# Patient Record
Sex: MALE | Race: WHITE
[De-identification: names, ages, dates, MRNs, and addresses within clinical notes are randomized per-mention and may not be internally consistent; named-entity substitution may affect disease eponyms.]

---

## 2020-03-09 ENCOUNTER — HOSPITAL ENCOUNTER (INPATIENT)
Dept: HOSPITAL 95 - ER | Age: 60
LOS: 7 days | Discharge: HOME | DRG: 280 | End: 2020-03-16
Attending: INTERNAL MEDICINE | Admitting: INTERNAL MEDICINE
Payer: COMMERCIAL

## 2020-03-09 VITALS — HEIGHT: 72.99 IN | WEIGHT: 209.44 LBS | BODY MASS INDEX: 27.76 KG/M2

## 2020-03-09 DIAGNOSIS — J18.9: ICD-10-CM

## 2020-03-09 DIAGNOSIS — Z86.73: ICD-10-CM

## 2020-03-09 DIAGNOSIS — E11.22: ICD-10-CM

## 2020-03-09 DIAGNOSIS — I42.8: ICD-10-CM

## 2020-03-09 DIAGNOSIS — F17.210: ICD-10-CM

## 2020-03-09 DIAGNOSIS — I13.0: ICD-10-CM

## 2020-03-09 DIAGNOSIS — N18.2: ICD-10-CM

## 2020-03-09 DIAGNOSIS — I50.23: ICD-10-CM

## 2020-03-09 DIAGNOSIS — I21.A1: ICD-10-CM

## 2020-03-09 DIAGNOSIS — I48.20: Primary | ICD-10-CM

## 2020-03-09 DIAGNOSIS — G47.33: ICD-10-CM

## 2020-03-09 DIAGNOSIS — N17.9: ICD-10-CM

## 2020-03-09 LAB
ALBUMIN SERPL BCP-MCNC: 3.4 G/DL (ref 3.4–5)
ALBUMIN/GLOB SERPL: 0.8 {RATIO} (ref 0.8–1.8)
ALT SERPL W P-5'-P-CCNC: 98 U/L (ref 12–78)
ANION GAP SERPL CALCULATED.4IONS-SCNC: 9 MMOL/L (ref 6–16)
AST SERPL W P-5'-P-CCNC: 52 U/L (ref 12–37)
BASOPHILS # BLD AUTO: 0.03 K/MM3 (ref 0–0.23)
BASOPHILS NFR BLD AUTO: 0 % (ref 0–2)
BILIRUB SERPL-MCNC: 0.6 MG/DL (ref 0.1–1)
BUN SERPL-MCNC: 21 MG/DL (ref 8–24)
CALCIUM SERPL-MCNC: 8.6 MG/DL (ref 8.5–10.1)
CHLORIDE SERPL-SCNC: 107 MMOL/L (ref 98–108)
CO2 SERPL-SCNC: 20 MMOL/L (ref 21–32)
CREAT SERPL-MCNC: 1.27 MG/DL (ref 0.6–1.2)
DEPRECATED RDW RBC AUTO: 43.7 FL (ref 35.1–46.3)
EOSINOPHIL # BLD AUTO: 0.04 K/MM3 (ref 0–0.68)
EOSINOPHIL NFR BLD AUTO: 0 % (ref 0–6)
ERYTHROCYTE [DISTWIDTH] IN BLOOD BY AUTOMATED COUNT: 12.7 % (ref 11.7–14.2)
GLOBULIN SER CALC-MCNC: 4.1 G/DL (ref 2.2–4)
GLUCOSE SERPL-MCNC: 338 MG/DL (ref 70–99)
HCT VFR BLD AUTO: 41.2 % (ref 37–53)
HGB BLD-MCNC: 13.6 G/DL (ref 13.5–17.5)
IMM GRANULOCYTES # BLD AUTO: 0.02 K/MM3 (ref 0–0.1)
IMM GRANULOCYTES NFR BLD AUTO: 0 % (ref 0–1)
LYMPHOCYTES # BLD AUTO: 2.01 K/MM3 (ref 0.84–5.2)
LYMPHOCYTES NFR BLD AUTO: 23 % (ref 21–46)
MAGNESIUM SERPL-MCNC: 1.8 MG/DL (ref 1.6–2.4)
MCHC RBC AUTO-ENTMCNC: 33 G/DL (ref 31.5–36.5)
MCV RBC AUTO: 93 FL (ref 80–100)
MONOCYTES # BLD AUTO: 0.77 K/MM3 (ref 0.16–1.47)
MONOCYTES NFR BLD AUTO: 9 % (ref 4–13)
NEUTROPHILS # BLD AUTO: 6.04 K/MM3 (ref 1.96–9.15)
NEUTROPHILS NFR BLD AUTO: 68 % (ref 41–73)
NRBC # BLD AUTO: 0 K/MM3 (ref 0–0.02)
NRBC BLD AUTO-RTO: 0 /100 WBC (ref 0–0.2)
PLATELET # BLD AUTO: 215 K/MM3 (ref 150–400)
POTASSIUM SERPL-SCNC: 4.2 MMOL/L (ref 3.5–5.5)
PROT SERPL-MCNC: 7.5 G/DL (ref 6.4–8.2)
PROTHROMBIN TIME: 11.4 SEC (ref 9.7–11.5)
SODIUM SERPL-SCNC: 136 MMOL/L (ref 136–145)
TROPONIN I SERPL-MCNC: 0.13 NG/ML (ref 0–0.04)
TSH SERPL DL<=0.005 MIU/L-ACNC: 0.99 UIU/ML (ref 0.36–4.8)

## 2020-03-09 PROCEDURE — C1769 GUIDE WIRE: HCPCS

## 2020-03-09 PROCEDURE — C1887 CATHETER, GUIDING: HCPCS

## 2020-03-09 PROCEDURE — A9270 NON-COVERED ITEM OR SERVICE: HCPCS

## 2020-03-09 PROCEDURE — C1894 INTRO/SHEATH, NON-LASER: HCPCS

## 2020-03-09 NOTE — NUR
SHIFT SUMMARY.
A&OX4, INDEPENDENT IN ROOM, PLEASANT AND COPERATIVE. PT DENIES PAIN, SOB, N/V.
LUNGS CLEAR, ALTHOGH TIGHT, ON RA. PT REPORTS THAT HE FEELS THAT HE WILL NOT
WANT TO SMOKE FURTHER. NO NEW CHANGES OR CONCERNS.

## 2020-03-09 NOTE — NUR
1720 PT ADMITTED TO MEDICAL FLOOR VIA GURNEY, SELF TRANSFERED TO BED WITHOUT
ISSUE. PT INSISTED THAT HE GO OUTSIDE TO SMOKE IMMEDIATELY, PT COUNSELED ON
SMOKING SENSATION AND RISKS ASSOCIATED WITH SMOKING WITH CURRENT CONDITION,
INCLUDING SUDDEN DEATH. PT ON RA, LUNGS CLEAR ALTHOGH TIGHT.
PT REQUESTED W/C. ABLE TO ATTAIN SET OF VS. 1740 PT'S FRIEND WHEELED PT.
1750 PT RETURNED TO ROOM.

## 2020-03-10 LAB
ANION GAP SERPL CALCULATED.4IONS-SCNC: 6 MMOL/L (ref 6–16)
BASOPHILS # BLD AUTO: 0.05 K/MM3 (ref 0–0.23)
BASOPHILS NFR BLD AUTO: 1 % (ref 0–2)
BUN SERPL-MCNC: 24 MG/DL (ref 8–24)
CALCIUM SERPL-MCNC: 8.5 MG/DL (ref 8.5–10.1)
CHLORIDE SERPL-SCNC: 107 MMOL/L (ref 98–108)
CO2 SERPL-SCNC: 26 MMOL/L (ref 21–32)
CREAT SERPL-MCNC: 1.44 MG/DL (ref 0.6–1.2)
DEPRECATED RDW RBC AUTO: 44.5 FL (ref 35.1–46.3)
EOSINOPHIL # BLD AUTO: 0.23 K/MM3 (ref 0–0.68)
EOSINOPHIL NFR BLD AUTO: 3 % (ref 0–6)
ERYTHROCYTE [DISTWIDTH] IN BLOOD BY AUTOMATED COUNT: 13 % (ref 11.7–14.2)
GLUCOSE SERPL-MCNC: 237 MG/DL (ref 70–99)
HCT VFR BLD AUTO: 39.4 % (ref 37–53)
HGB BLD-MCNC: 12.8 G/DL (ref 13.5–17.5)
IMM GRANULOCYTES # BLD AUTO: 0.01 K/MM3 (ref 0–0.1)
IMM GRANULOCYTES NFR BLD AUTO: 0 % (ref 0–1)
LYMPHOCYTES # BLD AUTO: 4.54 K/MM3 (ref 0.84–5.2)
LYMPHOCYTES NFR BLD AUTO: 53 % (ref 21–46)
MCHC RBC AUTO-ENTMCNC: 32.5 G/DL (ref 31.5–36.5)
MCV RBC AUTO: 93 FL (ref 80–100)
MONOCYTES # BLD AUTO: 0.77 K/MM3 (ref 0.16–1.47)
MONOCYTES NFR BLD AUTO: 9 % (ref 4–13)
NEUTROPHILS # BLD AUTO: 3.05 K/MM3 (ref 1.96–9.15)
NEUTROPHILS NFR BLD AUTO: 35 % (ref 41–73)
NRBC # BLD AUTO: 0 K/MM3 (ref 0–0.02)
NRBC BLD AUTO-RTO: 0 /100 WBC (ref 0–0.2)
PLATELET # BLD AUTO: 190 K/MM3 (ref 150–400)
POTASSIUM SERPL-SCNC: 3.9 MMOL/L (ref 3.5–5.5)
SODIUM SERPL-SCNC: 139 MMOL/L (ref 136–145)

## 2020-03-10 NOTE — NUR
SHIFT SUMMARY
AOX4. TELE RUNNING AFIB c  THIS AM. REST VSS. DENIES DYSPNEA, N/V OR
CHEST PAIN. STATES WHEN HE LAYS FLAT HE GETS A "FLUTTER" FEELING IN CHEST,
THEREFORE SLEEPING W/HOB ELEVATED HELPS. RESPIRATORY PANEL NEGATIVE. SWABBED
NARES & THROAT FOR MRSA R/O. LUNGS SOUND TIGHT & DIM T/O. IND IN ROOM. CALL
LIGHT IN REACH.

## 2020-03-10 NOTE — NUR
SHIFT SUMMARY
 
PATIENT DENIES PAIN, NAUSEA, AND SHORTNESS OF BREATH. PATIENT UP INDEPENDENT
IN ROOM. PATIENT HAD CARDIAC CONSULT AND ECHO TODAY. NEW ORDERS FOR HEPARIN
DRIP THIS AFTERNOON. HEPARIN RUNNING AT 13 U/KG. DR. LOUIE SAW PATIENT THIS
EVENING, PATIENT HAVING POSSIBLE ANGIO TOMORROW. PATIENT STRICT I&O'S. CALL
LIGHT IN REACH.

## 2020-03-11 LAB
ANION GAP SERPL CALCULATED.4IONS-SCNC: 6 MMOL/L (ref 6–16)
BUN SERPL-MCNC: 28 MG/DL (ref 8–24)
CALCIUM SERPL-MCNC: 8.9 MG/DL (ref 8.5–10.1)
CHLORIDE SERPL-SCNC: 106 MMOL/L (ref 98–108)
CO2 SERPL-SCNC: 25 MMOL/L (ref 21–32)
CREAT SERPL-MCNC: 1.32 MG/DL (ref 0.6–1.2)
GLUCOSE SERPL-MCNC: 276 MG/DL (ref 70–99)
MAGNESIUM SERPL-MCNC: 1.8 MG/DL (ref 1.6–2.4)
PHOSPHATE SERPL-MCNC: 4.4 MG/DL (ref 2.5–4.9)
POTASSIUM SERPL-SCNC: 3.8 MMOL/L (ref 3.5–5.5)
SODIUM SERPL-SCNC: 137 MMOL/L (ref 136–145)

## 2020-03-11 PROCEDURE — B2111ZZ FLUOROSCOPY OF MULTIPLE CORONARY ARTERIES USING LOW OSMOLAR CONTRAST: ICD-10-PCS | Performed by: INTERNAL MEDICINE

## 2020-03-11 PROCEDURE — 5A2204Z RESTORATION OF CARDIAC RHYTHM, SINGLE: ICD-10-PCS | Performed by: INTERNAL MEDICINE

## 2020-03-11 PROCEDURE — 4A023N7 MEASUREMENT OF CARDIAC SAMPLING AND PRESSURE, LEFT HEART, PERCUTANEOUS APPROACH: ICD-10-PCS | Performed by: INTERNAL MEDICINE

## 2020-03-11 NOTE — NUR
PATIENT TRANSFER
 
PATIENT TRANSFERD TO [facility] [place] FOR ANGIO. PATIENT TO GO TO PCU AFTER
PROCEDURE. REPORT GIVEN TO RECEIVING PCU NURSE.

## 2020-03-11 NOTE — NUR
PATIENT ARRIVED FROM [facility] THIS AFTERNOON. TR BAND STARTED AT 15 CC,
REMOVED AT 1815. R. WRIST PUNCTURE SITE NON-TENDER, NO DISCHARGE NOTED.
PATIENT EXPRESSED FEELING ANXIOUS THIS EVENING, TOOK A WALK IN THE HALLWAY AND
ENDORSED RELIEF OF ANXIETY. PATIENT'S HEPARIN DRIP WAS STOPPED AT 1800 WITH
ADMINISTRATION OF XARELTO PER ORDERS. AMIODORONE DRIP RATE ADJUSTED AT 1845
AND TITRATED DOWN PER ORDERS.

## 2020-03-11 NOTE — NUR
SHIFT SUMMARY
NO ACUTE CHANGES THIS SHIFT. PT REQUESTED SLEEPING PILL THIS EVENING AND SLEPT
THROUGH MUCH OF THE NIGHT FOLLOWING. VITAL SIGNS STABLE. TELEMETRY READING
AFIB 110'S TONIGHT. PT DENIES ANY CHEST PAIN OR SOB. HEPARIN DRIP INFUSING
THROUGHOUT THE NIGHT, RATE INCREASED TO 15 UNITS/KG/HR. PT NPO EXCEPT ICE
CHIPS SINCE 0000 FOR PROCEDURE TODAY. PT RESTING IN BED AT THIS TIME. WILL
CONTINUE TO MONITOR.

## 2020-03-12 LAB
ANION GAP SERPL CALCULATED.4IONS-SCNC: 11 MMOL/L (ref 6–16)
BUN SERPL-MCNC: 31 MG/DL (ref 8–24)
CALCIUM SERPL-MCNC: 9 MG/DL (ref 8.5–10.1)
CHLORIDE SERPL-SCNC: 104 MMOL/L (ref 98–108)
CO2 SERPL-SCNC: 21 MMOL/L (ref 21–32)
CREAT SERPL-MCNC: 1.48 MG/DL (ref 0.6–1.2)
GLUCOSE SERPL-MCNC: 175 MG/DL (ref 70–99)
POTASSIUM SERPL-SCNC: 3.7 MMOL/L (ref 3.5–5.5)
SODIUM SERPL-SCNC: 136 MMOL/L (ref 136–145)

## 2020-03-12 NOTE — NUR
UPDATE
DR BROWNLEE NOTIFIED OF PATIENT'S LOW BLOOD PRESSURE. DR BROWNLEE STATED THAT IT WAS
FINE TO GO AHEAD AND GIVE PATIENT'S 2100 DOSE OF AMIODARONE.

## 2020-03-12 NOTE — NUR
SHIFT SUMMARY
PATIENT ANXIOUS BUT COOPERATIVE THROUGHOUT THE NIGHT. PATIENT VERY FIDGTY AND
UNCOMFORTABLE BUT PATIENT COULD NOT STATE EXACTLY WHY HE WAS UNCOMFORTABLE.
PATIENT CONTINUES TO BE SHORT OF BREATH WHEN LAYING DOWN. PATIENT EVEN STATED
THAT HE FELT NAUSOUS WHEN LAYING DOWN AT ONE TIME, MEDICATED PER EMAR. PATIENT
INDPENDENT IN THE ROOM AND WENT ON A WALK LAST NIGHT TO TRY TO HELP HIM SLEEP.
PATIENT APPEARED TO NAP ON AND OFF THROUGHOUT THE NIGHT. PATIENT USED 2L O2
PRN FOR COMFORT. PATIENT CURRENTLY RESTING IN BED. AMIODERONE GTT RUNNING PER
ORDERS. WILL CONTINUE TO MONITOR PATIENT AND GIVE BEDSIDE REPORT TO ONCOMING
RN.

## 2020-03-12 NOTE — NUR
NO ACUTE EVENTS THIS SHIFT. PATIENT STARTED ON PO AMIODORONE THIS MORNING. HAS
REMAINED IN A FIB THIS SHIFT IN THE 90S-110S. PATIENT ENDORSED FEELING
ANXIOUS, JITTERY, AND LIGHT SENSITIVE. DR. BASS WAS NOTIFIED, GAVE ORDERS FOR
PO ATIVAN. PATIENT ENDORSED THAT THE ATIVAN HELPED.

## 2020-03-13 LAB
ANION GAP SERPL CALCULATED.4IONS-SCNC: 9 MMOL/L (ref 6–16)
BUN SERPL-MCNC: 37 MG/DL (ref 8–24)
CALCIUM SERPL-MCNC: 8.8 MG/DL (ref 8.5–10.1)
CHLORIDE SERPL-SCNC: 106 MMOL/L (ref 98–108)
CO2 SERPL-SCNC: 21 MMOL/L (ref 21–32)
CREAT SERPL-MCNC: 2.3 MG/DL (ref 0.6–1.2)
GLUCOSE SERPL-MCNC: 207 MG/DL (ref 70–99)
MAGNESIUM SERPL-MCNC: 2.1 MG/DL (ref 1.6–2.4)
POTASSIUM SERPL-SCNC: 4.1 MMOL/L (ref 3.5–5.5)
SODIUM SERPL-SCNC: 136 MMOL/L (ref 136–145)

## 2020-03-13 NOTE — NUR
INITIAL ASSESSMENT
  PATIENT SITTING ON SIDE OF BED UPON ENTERING ROOM.  PATIENT ALERT AND
ORIENTED X 4, AFEBRILE.  PATIENT ANXIOUS AND IRRITABLE.  PATIENT GIVEN PRN
ATIVAN UPON REQUEST.  PATIENT INDEPENDENT IN ROOM.  PATIENT GIVEN PRN TYLENOL
FOR COMPLAINT OF HEADACHE.  PATIENT SATTING 90% AND GREATER ON RA.  LUNGS
CLEAR THROUGHOUT.  PATIENT STATES HE HAS OCCASIONAL DRY COUGH.  PATIENT IN
A.FIB, .  BP STABLE.  PULSES STRONG.  GI WNL PER PATIENT.   WNL.  R
RADIAL CATH LAB ACCESS SITE NOTED.  ARMBOARD AND TEGADERM IN PLACE.  NO
BLEEDING, BRUISING, OR HEMATOMA NOTED.  IV FLUSHED AND SALINE LOCKED.  BED
LOW, CALL LIGHT IN REACH.  WILL CONTINUE TO MONITOR PATIENT FREQUENTLY
THROUGHOUT SHIFT.

## 2020-03-13 NOTE — NUR
SHIFT SUMMARY
PATIENT ANXIOUS AT TIMES THROUGHOUT THE NIGHT, PATIENT MEDICATED FOR ANXIETY
PER EMAR AND AS ABLE TO DUE TO LOW BLOOD PRESSURE. PATIENT AWAKE AND UP
WALKING FREQUENTLY AT THE BEGINNING OF THE NIGHT BUT THEN WAS ABLE TO SETTLE
DOWN AND APPEARED TO SLEEP WELL THROUGHOUT THE REST OF THE NIGHT. PATIENT
REQUESTING NOT TO HAVE BEDSIDE REPORT THIS MORNING. WILL CONTINUE TO MONITOR
PATIENT AND REPORT TO ONCOMING RN.

## 2020-03-13 NOTE — NUR
SHIFT SUMMARY:
 
PT SELPT MOST OF THIS SHIFT. WENT FOR A WALK X 1, SHOWERED. USING BR
INDEPENDENTLY. ADEQUATE PO INTAKE. C/O HEADACHE, MEDICATED WITH TYLENOL WITH
GOOD EFFECT. DENIED CHEST DISCOMFORT, SOB, KOCH.

## 2020-03-13 NOTE — NUR
PATIENT CAME OUT OF ROOM TO NURSES DESK, SET DOWN TELE MONITOR THAT HE TOOK
OFF OF HIMSELF, AND STATED TO NURSE TO "TAKE IV OUT BECAUSE HE IS LEAVING".
NURSE SPOKE WITH PATIENT AND PATIENT CONTINUES TO BE IRRITABLE AND ANXIOUS.
PATIENT STATES HE IS UPSET BECAUSE NURSE GAVE HIM PRN ATIVAN 10 MINUTES AFTER
ASKING AND THAT IT DID NOT WORK BECAUSE IT "WAS GIVEN TOO LATE".  INFORMED
PATIENT THAT HE CAN HAVE PRN ATIVAN AGAIN AT MIDNIGHT AND THAT HE ALSO HAS PRN
AMBIEN AVAILABLE TO HELP WITH SLEEP.  PATIENT STATED THAT HE "DIDN'T WANT ANY
OF THAT CRAP" AND THAT HE JUST WANTED TO GO HOME.  PATIENT STATED THAT HE
CALLED HIS GF SAEID AND SHE WAS COMING FROM [place] TO COME AND GET HIM.
WILL HAVE PATIENT SIGN AMA FORM.  CHARGE NURSE, ROLAND, AT DESK AND WITNESSED
CONVERSATION.

## 2020-03-13 NOTE — NUR
2243:  PATIENT SIGNED AMA FORM AFTER LISTENING TO RISKS AND BENEFITS.  PATIENT
STATED HE UNDERSTANDS RISKS OF LEAVING AMA.  PATIENT STATES THAT GF WILL BE
HERE IN ABOUT AN HOUR TO GET HIM.
 
1051:  PATIENT WALKS OUT OF ROOM AND INFORMS NURSE THAT HE IS LEAVING AND THAT
GF WILL BE HERE TO CLEAR THE STUFF OUT OF HIS ROOM.  NURSE ASKS IF HE WILL BE
BACK WHEN GF GETS HERE AND HE STATES "I DON'T KNOW".  CHARGE NURSE, ROLAND,
INFORMED.

## 2020-03-14 LAB
ANION GAP SERPL CALCULATED.4IONS-SCNC: 11 MMOL/L (ref 6–16)
BASOPHILS # BLD AUTO: 0.09 K/MM3 (ref 0–0.23)
BASOPHILS NFR BLD AUTO: 1 % (ref 0–2)
BILIRUB UR QL STRIP: (no result)
BUN SERPL-MCNC: 48 MG/DL (ref 8–24)
CALCIUM SERPL-MCNC: 8.9 MG/DL (ref 8.5–10.1)
CHLORIDE SERPL-SCNC: 102 MMOL/L (ref 98–108)
CO2 SERPL-SCNC: 21 MMOL/L (ref 21–32)
CREAT SERPL-MCNC: 3.15 MG/DL (ref 0.6–1.2)
DEPRECATED RDW RBC AUTO: 43.7 FL (ref 35.1–46.3)
EOSINOPHIL # BLD AUTO: 0.09 K/MM3 (ref 0–0.68)
EOSINOPHIL NFR BLD AUTO: 1 % (ref 0–6)
ERYTHROCYTE [DISTWIDTH] IN BLOOD BY AUTOMATED COUNT: 12.7 % (ref 11.7–14.2)
GLUCOSE SERPL-MCNC: 256 MG/DL (ref 70–99)
HCT VFR BLD AUTO: 45.9 % (ref 37–53)
HGB BLD-MCNC: 14.9 G/DL (ref 13.5–17.5)
IMM GRANULOCYTES # BLD AUTO: 0.08 K/MM3 (ref 0–0.1)
IMM GRANULOCYTES NFR BLD AUTO: 1 % (ref 0–1)
KETONES UR STRIP-MCNC: (no result) MG/DL
LYMPHOCYTES # BLD AUTO: 5.67 K/MM3 (ref 0.84–5.2)
LYMPHOCYTES NFR BLD AUTO: 36 % (ref 21–46)
MAGNESIUM SERPL-MCNC: 2.1 MG/DL (ref 1.6–2.4)
MCHC RBC AUTO-ENTMCNC: 32.5 G/DL (ref 31.5–36.5)
MCV RBC AUTO: 94 FL (ref 80–100)
MONOCYTES # BLD AUTO: 1.44 K/MM3 (ref 0.16–1.47)
MONOCYTES NFR BLD AUTO: 9 % (ref 4–13)
NEUTROPHILS # BLD AUTO: 8.43 K/MM3 (ref 1.96–9.15)
NEUTROPHILS NFR BLD AUTO: 53 % (ref 41–73)
NRBC # BLD AUTO: 0.03 K/MM3 (ref 0–0.02)
NRBC BLD AUTO-RTO: 0.2 /100 WBC (ref 0–0.2)
PLATELET # BLD AUTO: 246 K/MM3 (ref 150–400)
POTASSIUM SERPL-SCNC: 4.7 MMOL/L (ref 3.5–5.5)
PROT UR STRIP-MCNC: (no result) MG/DL
RBC #/AREA URNS HPF: (no result) /HPF (ref 0–2)
SODIUM SERPL-SCNC: 134 MMOL/L (ref 136–145)
SP GR SPEC: 1.02 (ref 1–1.02)
UROBILINOGEN UR STRIP-MCNC: (no result) MG/DL
WBC #/AREA URNS HPF: (no result) /HPF (ref 0–5)

## 2020-03-14 NOTE — NUR
ASSUMED CARE:
 
PT RESTING QUIETLY AT THIS TIME. S.O. IN ROOM WITH PT. NO ACUTE NEEDS OR
CONCERNS AT PRESENT.

## 2020-03-14 NOTE — NUR
PATIENT'S GF SHOWED UP TO PICK HIM UP AT 0135.  PATIENT SOB FROM BEING OUTSIDE
SMOKING AND WAITING FOR GF.  GF STATES THAT PATIENT IS STAYING.  PATIENT
AGREES TO STAY AFTER SPEAKING WITH GF.  NURSE SPEAKS TO CHARGE NURSE, NURSE
SUPERVISOR AND DR. BLUE ABOUT PATIENT SIGNING AMA FORMS HOURS AGO.  IT IS
STATED BY DR. BLUE TO DISREGARD AMA FORM AND TO HAVE PATIENT STAY.  CHARGE
NURSE INFORMED.  PATIENT AND GF ASK IF PATIENT CAN HAVE ATIVAN AT THIS TIME.
NURSE INFORMS THEM THAT HE CAN.  GF JOKES THAT HE NEEDS A BEER WITH THE ATIVAN
TO HELP.  NURSE ASKS IF PATIENT DRINKS DAILY.  PATIENT DENIES AND STATES HE
DRINKS OCCASIONALLY.  PATIENT'S GF STATES HE DRINKS 2 BEERS EVERY DAY.  CIWA
SCORE OF 10 OBTAINED.  PATIENT GIVEN PRN ATIVAN.  IF DOES NOT HELP THEN WILL
CALL DR. BLUE AGAIN TO INFORM.

## 2020-03-14 NOTE — NUR
SHIFT SUMMARY:
 
PT SITTING UPRIGHT AT SIDE OF BED. ANXIOUS AT THIS TIME. CIWA 10. MEDICATED
WITH SCHEDULED LIBRIUM AND PRN ATIVAN. S.O. AT BEDSIDE. SHE HAS BEEN TAKING
HIM FOR WALKS IN THE WHEEL CHAIR TO HELP THE ANXIETY. DR CROFT CONSULTED AND
CAME TO SEE PT. SEE NEW ORDERS. GIVING PT SPACE TO ASSIST WITH ANXIETY AT THIS
TIME.

## 2020-03-14 NOTE — NUR
DR RICE NOTIFIED OF PT'S COMPLAINTS OF CONSTIPATION. PT REQUESTING ENEMA
WHICH DR RICE AGREED TO. STATES HE WILL BE BACK TO SEE PT LATER TODAY. PT
WAS IN RESTROOM WHEN ENTERED TO UPDATE. PT AWARE TO CALL STAFF WHEN READY FOR
ENEMA

## 2020-03-14 NOTE — NUR
DR. BLUE CALLED AND INFORMED THAT PATIENT'S GF STATED PATIENT DRINKS 2 BEERS
PER DAY.  INFORMED THAT CIWA SCORE REMAINS 10 EVEN AFTER GIVING PRN 0.5 MG
ATIVAN.  ORDERS RECEIVED.

## 2020-03-14 NOTE — NUR
APPROX 2320 PATIENT WALKED OUT OF ROOM AND ASKED 'DOES ANYONE HAVE A SHOT OF
HEROINE?'. PATIENT TOLD NO BY STAFF AND PATIENT STATED HE WAS GOING TO GO ON A
WALK. PATIENT EDUCATED ON AND AWARE OF GOING OFF UNIT FOR ANY REASON WHILE
HERE FOR AFIB WITH RVR. PATIENT STATES HE IS AWARE OF WHAT HIS DIAGNOSIS IS
AND HE IS AWARE THAT HIS TELEMTRY CANNOT GO OFF UNIT AND WILL NOT TRANSMITT
OFF UNIT.  PATIENT IS AWARE OF THE RISKS AND WILL GO WALKING ANYWAY.

## 2020-03-14 NOTE — NUR
CIWA SCORE OF 3.  PATIENT MUCH LESS AGITATED AND IRRITATED SINCE GETTING
LIBRIUM AND ATIVAN.  AFEBRILE.  HR IN THE 70S.  BP STABLE.  PATIENT REMAINS IN
A.FIB.  PATIENT COMPLAINING ABOUT CONSTIPATION.  ABDOMEN FIRM.  PATIENT DRANK
PRUNE JUICE EARLIER IN SHIFT.  PATIENT'S GF REMAINS AT BEDSIDE.  PATIENT HAS
NO COMPLAINTS OF PAIN AT THIS TIME.  NO OTHER ACUTE CHANGES TO NOTE ON AT
THIS TIME.  WILL CONTINUE TO MONITOR.

## 2020-03-14 NOTE — NUR
SHIFT SUMMARY
  PATIENT REMAINED ALERT AND ORIENTED X 4, AFEBRILE.  PATIENT VERY AGITATED
AND IRRITATED UP UNTIL GF ARRIVED HERE TO PICK HIM UP AS HE SIGNED AMA FORMS.
PATIENT STAYED AFTER SPEAKING WITH CHARGE NURSE, NURSE SUPERVISOR AND DOCTOR.
PATIENT'S GF STATES PATIENT DRINKS 2 BEERS PER DAY.  CIWA SCORE OF 10
INITIALLY.  PATIENT STARTED ON LIBRIUM AND PRN ATIVAN.  PATIENT MUCH MORE
PLEASANT AFTER.  LAST CIWA SCORE 3.  PATIENT GIVEN PRN TYLENOL OT DURING SHIFT
FOR COMPLAINTS OF HEADACHE.  PATIENT REMAINED INDEPENDENT IN ROOM.  PATIENT
REMAINED SATTING 90% AND GREATER ON RA.  PATIENT HAD DRY COUGH.  PATIENT SOB
WITH EXERTION WHEN WALKING IN RESENDEZ WHILE WAITING FOR GF.  PATIENT IN A.FIB, HR
70S .  BP STABLE.  PATIENT ON PO AMIODARONE.  PATIENT STATES HE IS
CONSTIPATED, LAST BM 4 DAYS AGO.  PATIENT RECEIVING SCHEDULED COLACE AND
SENOKOT.   WNL.  R RADIAL CATH LAB ACCESS SITE REMAINS WNL, WITH NO
BLEEDING, BRUISING, OR HEMATOMA NOTED.  BUN, CREATININE AND WBCS INCREASING.
GFR DECREASING.  PATIENT SLEEPING FOR THE FIRST TIME THIS SHIFT WITH NO
COMPLAINTS.  GF AT BEDSIDE.  BED LOW, CALL LIGHT IN REACH.  REPORT WILL BE
GIVEN TO [facility] DAY SHIFT NURSE SHORTLY.

## 2020-03-15 LAB
ALBUMIN SERPL BCP-MCNC: 2.9 G/DL (ref 3.4–5)
ANION GAP SERPL CALCULATED.4IONS-SCNC: 11 MMOL/L (ref 6–16)
BUN SERPL-MCNC: 50 MG/DL (ref 8–24)
CALCIUM SERPL-MCNC: 8.7 MG/DL (ref 8.5–10.1)
CHLORIDE SERPL-SCNC: 104 MMOL/L (ref 98–108)
CK SERPL-CCNC: 40 U/L (ref 39–308)
CO2 SERPL-SCNC: 22 MMOL/L (ref 21–32)
CREAT SERPL-MCNC: 2.46 MG/DL (ref 0.6–1.2)
GLUCOSE SERPL-MCNC: 121 MG/DL (ref 70–99)
PHOSPHATE SERPL-MCNC: 4.3 MG/DL (ref 2.5–4.9)
POTASSIUM SERPL-SCNC: 3.8 MMOL/L (ref 3.5–5.5)
SODIUM SERPL-SCNC: 137 MMOL/L (ref 136–145)

## 2020-03-15 NOTE — NUR
SHIFT SUMMARY:
 
PT HAS BEEN RESTING MOST OF THIS SHIFT. COVERED BLOOD SUGAR ONCE THIS SHIFT.
AMBULATED IN RESENDEZ ONCE THIS SHIFT. HAS DENIED NEEDS OR CONCERNS A MAJORITY OF
THE SHIFT.

## 2020-03-15 NOTE — NUR
SHIFT SUMMARY:
PATIENT BED ALARM ON AS HE IS GETTING INCREASINGLY UNSTABLE ON HIS FEET (AFTER
AMBIEN). ALL OTHER VSS, CALL LIGHT WITHIN REACH

## 2020-03-15 NOTE — NUR
APPROX 2350 PATIENT BROUGHT BACK TO ROOM BY ER STAFF VIA WHEELCHAIR STATING
PATIENT RAN OUT OF ENERGY AND NEEDED ASSISTANCE BACK TO ROOM. PATIENT
REQUESTED SLEEPING MEDICATION AND WENT TO BED. SECURITY ARRIVED AND LET [facility]
KNOW THAT THE PATIENT WAS SMOKING IN THE LOBBY OF THE ER. BED ALARM TURNED ON

## 2020-03-16 LAB
ANION GAP SERPL CALCULATED.4IONS-SCNC: 7 MMOL/L (ref 6–16)
BASOPHILS # BLD AUTO: 0.07 K/MM3 (ref 0–0.23)
BASOPHILS NFR BLD AUTO: 1 % (ref 0–2)
BUN SERPL-MCNC: 39 MG/DL (ref 8–24)
CALCIUM SERPL-MCNC: 8.3 MG/DL (ref 8.5–10.1)
CHLORIDE SERPL-SCNC: 108 MMOL/L (ref 98–108)
CO2 SERPL-SCNC: 25 MMOL/L (ref 21–32)
CREAT SERPL-MCNC: 1.76 MG/DL (ref 0.6–1.2)
DEPRECATED RDW RBC AUTO: 45.2 FL (ref 35.1–46.3)
EOSINOPHIL # BLD AUTO: 0.11 K/MM3 (ref 0–0.68)
EOSINOPHIL NFR BLD AUTO: 1 % (ref 0–6)
ERYTHROCYTE [DISTWIDTH] IN BLOOD BY AUTOMATED COUNT: 13.1 % (ref 11.7–14.2)
GLUCOSE SERPL-MCNC: 172 MG/DL (ref 70–99)
HCT VFR BLD AUTO: 39.3 % (ref 37–53)
HGB BLD-MCNC: 12.9 G/DL (ref 13.5–17.5)
IMM GRANULOCYTES # BLD AUTO: 0.04 K/MM3 (ref 0–0.1)
IMM GRANULOCYTES NFR BLD AUTO: 0 % (ref 0–1)
LYMPHOCYTES # BLD AUTO: 4.32 K/MM3 (ref 0.84–5.2)
LYMPHOCYTES NFR BLD AUTO: 40 % (ref 21–46)
MCHC RBC AUTO-ENTMCNC: 32.8 G/DL (ref 31.5–36.5)
MCV RBC AUTO: 95 FL (ref 80–100)
MONOCYTES # BLD AUTO: 1.09 K/MM3 (ref 0.16–1.47)
MONOCYTES NFR BLD AUTO: 10 % (ref 4–13)
NEUTROPHILS # BLD AUTO: 5.18 K/MM3 (ref 1.96–9.15)
NEUTROPHILS NFR BLD AUTO: 48 % (ref 41–73)
NRBC # BLD AUTO: 0 K/MM3 (ref 0–0.02)
NRBC BLD AUTO-RTO: 0 /100 WBC (ref 0–0.2)
PLATELET # BLD AUTO: 198 K/MM3 (ref 150–400)
POTASSIUM SERPL-SCNC: 3.9 MMOL/L (ref 3.5–5.5)
SODIUM SERPL-SCNC: 140 MMOL/L (ref 136–145)

## 2020-03-16 NOTE — NUR
DISCHARGE INSTRUCTIONS GIVEN TO PT AND GIRLFRIEND ABOUT FOLLOW UP APPOINTMENTS
AND MEDICATIONS. REVIEWED DIABETIC TEACHINGS AND RISKS WITH PT AND INSULIN
USE. INSTRUCTED TO GET GLUCOMETER FROM [facility] AND MEDICATIONS FROM
PHARMACY. DENIED QUESTIONS. AMBULATORY UPON DISCHARGE

## 2020-03-16 NOTE — NUR
SHIFT SUMMARY
NO ACUTE CHANGES NOTED THROUGH THE NIGHT. PT REMAINS A&O X4, VSS, CIWA >3
NOTED. PT'S WIFE STAYED THE NIGHT IN THR ROOM. HE DID "GO FOR A WALK" IN THE
HALLS X1 AND ENDED UP GOING OUTSIDE TO SMOKE, HE BECAME SOB & FATIGUED ON HIS
WAY BACK TO THE ROOM AND HAD TO TAKE FREQUENT BREAKS, PT WAS ESCORTED BACK
TO HIS ROOM, PT EDUCATION WAS PROVIDED, VS REMAINED WNL. RIGHT RADIAL SITE
C/D/I, NO S/S HEMATOMA OR BRUISING. CALL LIGHT IN REACH

## 2020-03-17 LAB
ANTIPROTEINASE 3 (PR-3) ABS: <3.5 U/ML (ref 0–3.5)
ATYPICAL PANCA: (no result) TITER
CYTOPLASMIC (C-ANCA): (no result) TITER
MYELOPEROXIDASE AB SER IA-ACNC: <9 U/ML (ref 0–9)
PERINUCLEAR (P-ANCA): (no result) TITER

## 2020-03-18 LAB
C3 SERPL-MCNC: 81 MG/DL (ref 82–167)
C4 SERPL-MCNC: 13 MG/DL (ref 14–44)

## 2020-03-27 ENCOUNTER — HOSPITAL ENCOUNTER (EMERGENCY)
Dept: HOSPITAL 95 - ER | Age: 60
Discharge: HOME | End: 2020-03-27
Payer: COMMERCIAL

## 2020-03-27 VITALS — HEIGHT: 73 IN | WEIGHT: 220 LBS | BODY MASS INDEX: 29.16 KG/M2

## 2020-03-27 DIAGNOSIS — I10: ICD-10-CM

## 2020-03-27 DIAGNOSIS — I49.3: ICD-10-CM

## 2020-03-27 DIAGNOSIS — Z79.899: ICD-10-CM

## 2020-03-27 DIAGNOSIS — F17.210: ICD-10-CM

## 2020-03-27 DIAGNOSIS — N48.89: Primary | ICD-10-CM

## 2020-03-27 DIAGNOSIS — T50.905A: ICD-10-CM

## 2020-03-27 LAB
ALBUMIN SERPL BCP-MCNC: 3.3 G/DL (ref 3.4–5)
ALBUMIN/GLOB SERPL: 0.8 {RATIO} (ref 0.8–1.8)
ALT SERPL W P-5'-P-CCNC: 86 U/L (ref 12–78)
ANION GAP SERPL CALCULATED.4IONS-SCNC: 6 MMOL/L (ref 6–16)
AST SERPL W P-5'-P-CCNC: 46 U/L (ref 12–37)
BASOPHILS # BLD AUTO: 0.07 K/MM3 (ref 0–0.23)
BASOPHILS NFR BLD AUTO: 1 % (ref 0–2)
BILIRUB SERPL-MCNC: 0.8 MG/DL (ref 0.1–1)
BILIRUB UR QL STRIP: (no result)
BUN SERPL-MCNC: 21 MG/DL (ref 8–24)
CALCIUM SERPL-MCNC: 8.4 MG/DL (ref 8.5–10.1)
CHLORIDE SERPL-SCNC: 112 MMOL/L (ref 98–108)
CO2 SERPL-SCNC: 24 MMOL/L (ref 21–32)
CREAT SERPL-MCNC: 1.74 MG/DL (ref 0.6–1.2)
DEPRECATED RDW RBC AUTO: 47.9 FL (ref 35.1–46.3)
EOSINOPHIL # BLD AUTO: 0.31 K/MM3 (ref 0–0.68)
EOSINOPHIL NFR BLD AUTO: 2 % (ref 0–6)
ERYTHROCYTE [DISTWIDTH] IN BLOOD BY AUTOMATED COUNT: 13.2 % (ref 11.7–14.2)
GLOBULIN SER CALC-MCNC: 3.9 G/DL (ref 2.2–4)
GLUCOSE SERPL-MCNC: 168 MG/DL (ref 70–99)
GLUCOSE UR-MCNC: (no result) MG/DL
HCT VFR BLD AUTO: 46.5 % (ref 37–53)
HGB BLD-MCNC: 14.5 G/DL (ref 13.5–17.5)
IMM GRANULOCYTES # BLD AUTO: 0.02 K/MM3 (ref 0–0.1)
IMM GRANULOCYTES NFR BLD AUTO: 0 % (ref 0–1)
KETONES UR STRIP-MCNC: (no result) MG/DL
LEUKOCYTE ESTERASE UR QL STRIP: (no result)
LYMPHOCYTES # BLD AUTO: 5.9 K/MM3 (ref 0.84–5.2)
LYMPHOCYTES NFR BLD AUTO: 46 % (ref 21–46)
MCHC RBC AUTO-ENTMCNC: 31.2 G/DL (ref 31.5–36.5)
MCV RBC AUTO: 99 FL (ref 80–100)
MONOCYTES # BLD AUTO: 1.03 K/MM3 (ref 0.16–1.47)
MONOCYTES NFR BLD AUTO: 8 % (ref 4–13)
NEUTROPHILS # BLD AUTO: 5.47 K/MM3 (ref 1.96–9.15)
NEUTROPHILS NFR BLD AUTO: 43 % (ref 41–73)
NRBC # BLD AUTO: 0 K/MM3 (ref 0–0.02)
NRBC BLD AUTO-RTO: 0 /100 WBC (ref 0–0.2)
PLATELET # BLD AUTO: 227 K/MM3 (ref 150–400)
POTASSIUM SERPL-SCNC: 5 MMOL/L (ref 3.5–5.5)
PROT SERPL-MCNC: 7.2 G/DL (ref 6.4–8.2)
PROT UR STRIP-MCNC: (no result) MG/DL
SODIUM SERPL-SCNC: 142 MMOL/L (ref 136–145)
SP GR SPEC: 1.02 (ref 1–1.02)
UROBILINOGEN UR STRIP-MCNC: (no result) MG/DL
WBC #/AREA URNS HPF: (no result) /HPF (ref 0–5)

## 2020-03-28 ENCOUNTER — HOSPITAL ENCOUNTER (INPATIENT)
Dept: HOSPITAL 95 - ER | Age: 60
LOS: 4 days | Discharge: HOME | DRG: 682 | End: 2020-04-01
Attending: HOSPITALIST | Admitting: HOSPITALIST
Payer: COMMERCIAL

## 2020-03-28 VITALS — WEIGHT: 219.36 LBS | BODY MASS INDEX: 29.07 KG/M2 | HEIGHT: 72.99 IN

## 2020-03-28 DIAGNOSIS — E83.39: ICD-10-CM

## 2020-03-28 DIAGNOSIS — G47.33: ICD-10-CM

## 2020-03-28 DIAGNOSIS — Z91.14: ICD-10-CM

## 2020-03-28 DIAGNOSIS — N18.3: ICD-10-CM

## 2020-03-28 DIAGNOSIS — F15.10: ICD-10-CM

## 2020-03-28 DIAGNOSIS — I50.23: ICD-10-CM

## 2020-03-28 DIAGNOSIS — N17.9: Primary | ICD-10-CM

## 2020-03-28 DIAGNOSIS — F17.210: ICD-10-CM

## 2020-03-28 DIAGNOSIS — I42.9: ICD-10-CM

## 2020-03-28 DIAGNOSIS — I13.0: ICD-10-CM

## 2020-03-28 DIAGNOSIS — F10.10: ICD-10-CM

## 2020-03-28 DIAGNOSIS — E87.2: ICD-10-CM

## 2020-03-28 DIAGNOSIS — F41.9: ICD-10-CM

## 2020-03-28 DIAGNOSIS — E87.5: ICD-10-CM

## 2020-03-28 DIAGNOSIS — E78.5: ICD-10-CM

## 2020-03-28 DIAGNOSIS — Z79.4: ICD-10-CM

## 2020-03-28 DIAGNOSIS — I25.10: ICD-10-CM

## 2020-03-28 DIAGNOSIS — E11.22: ICD-10-CM

## 2020-03-28 DIAGNOSIS — I48.0: ICD-10-CM

## 2020-03-28 LAB
ALBUMIN SERPL BCP-MCNC: 3.2 G/DL (ref 3.4–5)
ALBUMIN SERPL BCP-MCNC: 3.4 G/DL (ref 3.4–5)
ALBUMIN/GLOB SERPL: 0.8 {RATIO} (ref 0.8–1.8)
ALT SERPL W P-5'-P-CCNC: 103 U/L (ref 12–78)
AMPHETAMINES UR SCN-MCNC: DETECTED NG/ML
AMPHETAMINES UR-MCNC: DETECTED UG/L
ANION GAP SERPL CALCULATED.4IONS-SCNC: 9 MMOL/L (ref 6–16)
ANION GAP SERPL CALCULATED.4IONS-SCNC: 9 MMOL/L (ref 6–16)
AST SERPL W P-5'-P-CCNC: 70 U/L (ref 12–37)
BASOPHILS # BLD AUTO: 0.07 K/MM3 (ref 0–0.23)
BASOPHILS NFR BLD AUTO: 1 % (ref 0–2)
BENZODIAZ UR-MCNC: DETECTED UG/L
BILIRUB SERPL-MCNC: 0.8 MG/DL (ref 0.1–1)
BUN SERPL-MCNC: 37 MG/DL (ref 8–24)
BUN SERPL-MCNC: 39 MG/DL (ref 8–24)
CALCIUM SERPL-MCNC: 8.3 MG/DL (ref 8.5–10.1)
CALCIUM SERPL-MCNC: 8.7 MG/DL (ref 8.5–10.1)
CHLORIDE SERPL-SCNC: 108 MMOL/L (ref 98–108)
CHLORIDE SERPL-SCNC: 109 MMOL/L (ref 98–108)
CO2 SERPL-SCNC: 20 MMOL/L (ref 21–32)
CO2 SERPL-SCNC: 21 MMOL/L (ref 21–32)
CREAT SERPL-MCNC: 3.33 MG/DL (ref 0.6–1.2)
CREAT SERPL-MCNC: 3.51 MG/DL (ref 0.6–1.2)
DEPRECATED RDW RBC AUTO: 47 FL (ref 35.1–46.3)
EOSINOPHIL # BLD AUTO: 0.06 K/MM3 (ref 0–0.68)
EOSINOPHIL NFR BLD AUTO: 0 % (ref 0–6)
ERYTHROCYTE [DISTWIDTH] IN BLOOD BY AUTOMATED COUNT: 13.2 % (ref 11.7–14.2)
GLOBULIN SER CALC-MCNC: 4 G/DL (ref 2.2–4)
GLUCOSE SERPL-MCNC: 119 MG/DL (ref 70–99)
GLUCOSE SERPL-MCNC: 187 MG/DL (ref 70–99)
HCT VFR BLD AUTO: 48.4 % (ref 37–53)
HGB BLD-MCNC: 15.2 G/DL (ref 13.5–17.5)
IMM GRANULOCYTES # BLD AUTO: 0.04 K/MM3 (ref 0–0.1)
IMM GRANULOCYTES NFR BLD AUTO: 0 % (ref 0–1)
LYMPHOCYTES # BLD AUTO: 4.68 K/MM3 (ref 0.84–5.2)
LYMPHOCYTES NFR BLD AUTO: 34 % (ref 21–46)
MAGNESIUM SERPL-MCNC: 2 MG/DL (ref 1.6–2.4)
MCHC RBC AUTO-ENTMCNC: 31.4 G/DL (ref 31.5–36.5)
MCV RBC AUTO: 98 FL (ref 80–100)
MONOCYTES # BLD AUTO: 1.08 K/MM3 (ref 0.16–1.47)
MONOCYTES NFR BLD AUTO: 8 % (ref 4–13)
NEUTROPHILS # BLD AUTO: 7.93 K/MM3 (ref 1.96–9.15)
NEUTROPHILS NFR BLD AUTO: 57 % (ref 41–73)
NRBC # BLD AUTO: 0 K/MM3 (ref 0–0.02)
NRBC BLD AUTO-RTO: 0 /100 WBC (ref 0–0.2)
PHOSPHATE SERPL-MCNC: 5.6 MG/DL (ref 2.5–4.9)
PHOSPHATE SERPL-MCNC: 5.8 MG/DL (ref 2.5–4.9)
PLATELET # BLD AUTO: 235 K/MM3 (ref 150–400)
POTASSIUM SERPL-SCNC: 5.2 MMOL/L (ref 3.5–5.5)
POTASSIUM SERPL-SCNC: 6.1 MMOL/L (ref 3.5–5.5)
PROT SERPL-MCNC: 7.4 G/DL (ref 6.4–8.2)
PROTHROMBIN TIME: 20.9 SEC (ref 9.7–11.5)
SODIUM SERPL-SCNC: 137 MMOL/L (ref 136–145)
SODIUM SERPL-SCNC: 139 MMOL/L (ref 136–145)
TROPONIN I SERPL-MCNC: 0.31 NG/ML (ref 0–0.04)

## 2020-03-28 PROCEDURE — G0480 DRUG TEST DEF 1-7 CLASSES: HCPCS

## 2020-03-29 LAB
ALBUMIN SERPL BCP-MCNC: 3 G/DL (ref 3.4–5)
ALBUMIN/GLOB SERPL: 0.9 {RATIO} (ref 0.8–1.8)
ALT SERPL W P-5'-P-CCNC: 107 U/L (ref 12–78)
ANION GAP SERPL CALCULATED.4IONS-SCNC: 10 MMOL/L (ref 6–16)
AST SERPL W P-5'-P-CCNC: 86 U/L (ref 12–37)
BASOPHILS # BLD AUTO: 0.08 K/MM3 (ref 0–0.23)
BASOPHILS NFR BLD AUTO: 1 % (ref 0–2)
BILIRUB SERPL-MCNC: 0.8 MG/DL (ref 0.1–1)
BUN SERPL-MCNC: 43 MG/DL (ref 8–24)
CALCIUM SERPL-MCNC: 8.4 MG/DL (ref 8.5–10.1)
CHLORIDE SERPL-SCNC: 110 MMOL/L (ref 98–108)
CK SERPL-CCNC: 116 U/L (ref 39–308)
CO2 SERPL-SCNC: 18 MMOL/L (ref 21–32)
CREAT SERPL-MCNC: 3.37 MG/DL (ref 0.6–1.2)
DEPRECATED RDW RBC AUTO: 46.5 FL (ref 35.1–46.3)
EOSINOPHIL # BLD AUTO: 0.14 K/MM3 (ref 0–0.68)
EOSINOPHIL NFR BLD AUTO: 1 % (ref 0–6)
ERYTHROCYTE [DISTWIDTH] IN BLOOD BY AUTOMATED COUNT: 13.2 % (ref 11.7–14.2)
GLOBULIN SER CALC-MCNC: 3.4 G/DL (ref 2.2–4)
GLUCOSE SERPL-MCNC: 123 MG/DL (ref 70–99)
HCT VFR BLD AUTO: 44.1 % (ref 37–53)
HGB BLD-MCNC: 13.9 G/DL (ref 13.5–17.5)
IMM GRANULOCYTES # BLD AUTO: 0.02 K/MM3 (ref 0–0.1)
IMM GRANULOCYTES NFR BLD AUTO: 0 % (ref 0–1)
LYMPHOCYTES # BLD AUTO: 5.84 K/MM3 (ref 0.84–5.2)
LYMPHOCYTES NFR BLD AUTO: 46 % (ref 21–46)
MAGNESIUM SERPL-MCNC: 2.1 MG/DL (ref 1.6–2.4)
MCHC RBC AUTO-ENTMCNC: 31.5 G/DL (ref 31.5–36.5)
MCV RBC AUTO: 96 FL (ref 80–100)
MONOCYTES # BLD AUTO: 0.92 K/MM3 (ref 0.16–1.47)
MONOCYTES NFR BLD AUTO: 7 % (ref 4–13)
NEUTROPHILS # BLD AUTO: 5.69 K/MM3 (ref 1.96–9.15)
NEUTROPHILS NFR BLD AUTO: 45 % (ref 41–73)
NRBC # BLD AUTO: 0 K/MM3 (ref 0–0.02)
NRBC BLD AUTO-RTO: 0 /100 WBC (ref 0–0.2)
PHOSPHATE SERPL-MCNC: 5.3 MG/DL (ref 2.5–4.9)
PLATELET # BLD AUTO: 219 K/MM3 (ref 150–400)
POTASSIUM SERPL-SCNC: 5.1 MMOL/L (ref 3.5–5.5)
PROT SERPL-MCNC: 6.4 G/DL (ref 6.4–8.2)
SODIUM SERPL-SCNC: 138 MMOL/L (ref 136–145)
URATE SERPL-MCNC: 9.4 MG/DL (ref 3.5–7.2)

## 2020-03-29 NOTE — NUR
SHIFT SUMMARY:
 
PT HAS A 24HR URINE COLLECTION, THAT WILL END 0115, 3/30/20. PT SLEPT THROUGH
MOST OF THE SHIFT. PT HAS BEEN ON RA. PT STATES HE HAS A CPAP AT HOME HE DOES
NOT USE. PT SPO2 REMAINED ABOVE 95%, ON RA T/O SHIFT. NO SOB NOTED. PT HAS
BEEN HAVING A NON-PRODUCTIVE COUGH, HAS BEEN AFEBRILE. CONTINUES TO BE IN
A-FIB, DAVID HOSPITALIST HELD XARELTO.  CAME TO SEE THE PATIENT AT
BEDSIDE. PT C/O NAUSEA DUE TO " HAVING SO MUCH BLOOD DRAWN OUT", PT THEN
REQUESTED ORANGE JUICE. PT WAS EDUCATED ON FOODS WITH HIGH POTASSIUM. WILL
CONTINUE TO MONITOR PT UNTIL REPORT IS GIVEN TO ONCOMING SHIFT.

## 2020-03-29 NOTE — NUR
SHIFT SUMMARY:
 
PT HAS BEEN ATTEMPTING TO NAP THIS AFTERNOON. CIWA 2 MAX TODAY. DENIES NEEDS
FOR LIBRIUM OR ANXIETY MEDS AT THIS TIME. BUMEX FOR DIURESIS. 24 HOUR URINE
STILL OCCURRING UNTIL 1AM. NO ACUTE NEEDS OR CONCERNS AT THIS TIME.

## 2020-03-29 NOTE — NUR
ASSUMED CARE:
 
PT AWAKE, TALKING TO STAFF, STATES HE FEELS ANXIOUS AND HASN'T SLEPT. GOT
VITALS, CHECKED ICE FOR 24 HOUR URINE. TOLD PT WE WOULD TRY TO LET HIM SLEEP
FOR A COUPLE HOURS. NO FURTHER NEEDS OR CONCERNS AT THIS TIME.

## 2020-03-29 NOTE — NUR
Assumed Care
Pt alert and oriented, able to express needs with call light, VSS.
CIWA of 1 at time of arrival, pt endoreses mild anxiety, denies all other
withdrawl symptoms. Pt appears calm and cooperative. Pt educated on CIWA
throughout night and purpose to monitor sx of withdrawl. Pt verbalized
understanding and appreciation to staff.
 
See shift assessment for detailed assessment. Pt up in room for BM this
shift, steady gait. No acute concerns to note at this time. will continue
to monitor.

## 2020-03-30 LAB
ALBUMIN SERPL BCP-MCNC: 2.7 G/DL (ref 3.4–5)
ANION GAP SERPL CALCULATED.4IONS-SCNC: 9 MMOL/L (ref 6–16)
BUN SERPL-MCNC: 55 MG/DL (ref 8–24)
CALCIUM SERPL-MCNC: 8.1 MG/DL (ref 8.5–10.1)
CHLORIDE SERPL-SCNC: 110 MMOL/L (ref 98–108)
CO2 SERPL-SCNC: 23 MMOL/L (ref 21–32)
CREAT SERPL-MCNC: 2.94 MG/DL (ref 0.6–1.2)
GLUCOSE SERPL-MCNC: 89 MG/DL (ref 70–99)
HCT VFR BLD AUTO: 40.9 % (ref 37–53)
HEP C VIRUS AB: >11 (ref 0–0.9)
HGB BLD-MCNC: 13.5 G/DL (ref 13.5–17.5)
MAGNESIUM SERPL-MCNC: 2 MG/DL (ref 1.6–2.4)
PHOSPHATE SERPL-MCNC: 5.2 MG/DL (ref 2.5–4.9)
POTASSIUM SERPL-SCNC: 3.9 MMOL/L (ref 3.5–5.5)
PROT UR-MCNC: 9.7 MG/DL (ref 0–11.9)
SODIUM SERPL-SCNC: 142 MMOL/L (ref 136–145)

## 2020-03-30 NOTE — NUR
Shift Summary
Pt with no acute events overnight. CIWA 0 throughout shift. Pt asleep for
approx 10 hrs. Up to bathroom for voiding, 24 hour urine collected and
walked to lab at 0115. Pt remains alert and oriented, VSS, pt has been
calm and cooperative, appreciative of staff. No changes from initial shit
assessment. Will continue to monitor.

## 2020-03-30 NOTE — NUR
UPDATE
PT ALERT AND ORIENTED. VS STABLE. HR AFLUTTER IN THE 80'S. PT DENIES ANY PAIN.
DR. PEPE IN WITH ORDERS TO TRANSFER TO MEDICAL FLOOR. REPORT CALLED TO MEDICAL
FLOOR RN. PT TO BE TAKEN UP BY WHEELCHAIR.

## 2020-03-30 NOTE — NUR
PATIENT ARRIVED TO THE UNIT VIA W/C. PATIENT IS ALERT AND ORIENTED.
INDEPENDENT IN THE ROOM. NO COVERAGE FOR INSULIN REQUIRED PRIOR TO LUNCH.
PATIENT IN [facility] [employer]ITS.

## 2020-03-31 LAB
ALBUMIN SERPL BCP-MCNC: 2.6 G/DL (ref 3.4–5)
ALBUMIN SERPL-MCNC: 3 G/DL (ref 2.9–4.4)
ALBUMIN/GLOB SERPL: 1.2 {RATIO} (ref 0.7–1.7)
ALPHA1 GLOB SERPL ELPH-MCNC: 0.3 G/DL (ref 0–0.4)
ALPHA2 GLOB SERPL ELPH-MCNC: 0.5 G/DL (ref 0.4–1)
ANION GAP SERPL CALCULATED.4IONS-SCNC: 8 MMOL/L (ref 6–16)
B-GLOBULIN SERPL ELPH-MCNC: 0.7 G/DL (ref 0.7–1.3)
BUN SERPL-MCNC: 50 MG/DL (ref 8–24)
CALCIUM SERPL-MCNC: 8.4 MG/DL (ref 8.5–10.1)
CHLORIDE SERPL-SCNC: 109 MMOL/L (ref 98–108)
CO2 SERPL-SCNC: 27 MMOL/L (ref 21–32)
CREAT SERPL-MCNC: 2.23 MG/DL (ref 0.6–1.2)
GAMMA GLOB SERPL ELPH-MCNC: 1.2 G/DL (ref 0.4–1.8)
GLOBULIN SER CALC-MCNC: 2.7 G/DL (ref 2.2–3.9)
GLUCOSE SERPL-MCNC: 93 MG/DL (ref 70–99)
HCT VFR BLD AUTO: 41.8 % (ref 37–53)
HGB BLD-MCNC: 13.3 G/DL (ref 13.5–17.5)
IGG SERPL-MCNC: 1235 MG/DL (ref 700–1600)
IGM SERPL-MCNC: 141 MG/DL (ref 20–172)
MAGNESIUM SERPL-MCNC: 1.8 MG/DL (ref 1.6–2.4)
PHOSPHATE SERPL-MCNC: 4.5 MG/DL (ref 2.5–4.9)
POTASSIUM SERPL-SCNC: 3.5 MMOL/L (ref 3.5–5.5)
PROT SERPL-MCNC: 5.7 G/DL (ref 6–8.5)
SODIUM SERPL-SCNC: 144 MMOL/L (ref 136–145)

## 2020-03-31 NOTE — NUR
PT RESTING QUIETLY AT START OF SHIFT. PLEASANT AND CO-OP. DENIED NEEDS.
INDEPENDENT IN RM AND TO BTHRM. ABLE TO WALK HALLS WITH P/T AND LATER WITH
CNA. DR SPEARS IN TO SEE PT THIS AM. PALLIATIVE CARE HERE THIS AFTERNOON. PT
APPEARED TO CHANGE SUBJECT WHEN DISCUSSING CARDIAC AND KIDNEY ISSUES. PT LATER
BECOMING ANXIOUS D/T FINANCES, PER CNA. PT WITH HX OF ETOH, METH AND OTHER
DRUG ABUSE. A-FLUTTER @ 87 PER TELE MX. CAPSULE UNABLE TO ACCURATELY READ HR
WHEN VS TAKEN. DR QUIGLEY HERE TO SEE PT PRIOR TO START OF DAY SHIFT. CALL LT IN
REACH. DENIED FURTHER NEEDS.

## 2020-03-31 NOTE — NUR
PT. WITH HR IN THE 40'S. PT. ASYMPTOMATIC. NOTIFIED DR. BOYKIN. RECEIVED
ORDER FOR TELEMETRY AND CONT TO MONITOR.

## 2020-03-31 NOTE — NUR
PATIENT IS ALERT AND ORIENTED AND COOPERATIVE WITH CARE. PATIENT COMPLAINED OF
FEELING ANXIOUS AND UPSET THIS AFTERNOON, MEDICATED PER EMAR. NO COMPLAINTS OF
PAIN. PATIENT IS INDEPENDENT IN HIS ROOM. WILL CONTINUE TO MONITOR.

## 2020-03-31 NOTE — NUR
Pt resting in bed upon arrival and is A&O. Pt denies pain and nausea. Pt
reports mild anxiety due to finances and not working. Pt reports his dyspnea
has improved since being admitted to hospital.
 
Pt appears somewhat withdrawn as evidenced by changing the subject regarding
his health. Attempted to engage in therapeutic discussion regarding advanced
care planning. Discussed the importance of seeing his PCP and complying with
recommendations regarding his kidney and heart issues. Pt states "I'm doing
better than when I came in". He reports he is able to ambulate considerable
distance now before becoming SOB. Pt changes the subject and discusses his
finances further. Offered therapeutic listening. This RN re-enforced the
importance of seeing his PCP regarding his health issues. Pt reports no other
concerns at this time.
 
Spoke with Bedside ETHAN Paul and discussed case.
 
Palliative Care will remain available.

## 2020-03-31 NOTE — NUR
SHIFT SUMMARY-
PT. A&O, PLEASANT AND COOPERATIVE WITH CARE. HAD RESTFUL NIGHT. NO APPARENT
DISTRESS NOTED. DENIED ANY PAIN OR DISCOMFORT. C/O 1X OF FEELING ANXIOUS.
TREATED PER EMAR WITH GOOD RELIEF. AM HR IN THE LOW 40'S, PT. ASYMPTOMATIC.
DR. BOYKIN WAS NOTIFED. ORDER RECEIVED TO CONT TO MONITOR AND PLACE ON
TELEMETRY. PER TELE TECH PT. HR 82/AFLUTTER. PT. RESTING COMFORTABLY IN BED,
NO APPARENT DISTRESS NOTED. DENIES COMPLAINTS. CALL LIGHT WITHIN REACH AND
SIDE RAILS UP X2. WILL CONT TO MONITOR.

## 2020-04-01 LAB
ALBUMIN SERPL BCP-MCNC: 2.6 G/DL (ref 3.4–5)
ANION GAP SERPL CALCULATED.4IONS-SCNC: 7 MMOL/L (ref 6–16)
ANTIGLOMERULAR BM AB: 3 UNITS (ref 0–20)
BUN SERPL-MCNC: 35 MG/DL (ref 8–24)
CALCIUM SERPL-MCNC: 7.9 MG/DL (ref 8.5–10.1)
CHLORIDE SERPL-SCNC: 107 MMOL/L (ref 98–108)
CO2 SERPL-SCNC: 27 MMOL/L (ref 21–32)
CREAT SERPL-MCNC: 1.68 MG/DL (ref 0.6–1.2)
GLUCOSE SERPL-MCNC: 126 MG/DL (ref 70–99)
HCT VFR BLD AUTO: 40 % (ref 37–53)
HGB BLD-MCNC: 13 G/DL (ref 13.5–17.5)
MAGNESIUM SERPL-MCNC: 1.6 MG/DL (ref 1.6–2.4)
PHOSPHATE SERPL-MCNC: 3.8 MG/DL (ref 2.5–4.9)
POTASSIUM SERPL-SCNC: 3.4 MMOL/L (ref 3.5–5.5)
SODIUM SERPL-SCNC: 141 MMOL/L (ref 136–145)

## 2020-04-01 NOTE — NUR
VASCULAR ACCESS
FIELD START IV REMOVED DUE TO PT DISCHARGE ON 4/1/2020 AT 1050. IV D/C WNL.
CATHETER INTACT.  PATIENT TOLERATED REMOVAL WELL.

## 2020-04-01 NOTE — NUR
Discharge instructions reviewed with patient. Reviewed diabetes education,
patient selfadministered am dose of lantus appropriately. Gave hard rx to
pickup glucometer, lancets, strips from medical supply store. Peripheral iv
removed. Discussed follow up with pcp, patient verbalized understanding, appt
already made for later this month.
Provided education regarding diabetes and testing, new meds, and follow up.
Patient verbalized understanding.

## 2020-04-01 NOTE — NUR
PT. HAD NO ACUTE CHANGES OVERNIGHT. ASLEEP T/O THE NIGHT. NO APPARENT DISTRESS
NOTED. DENIED ANY PAIN OR DISCOMFORT. K LEVEL THIS AM AT 3.4. PT. TO START PO
KCL DAILY PER ORDER. PLAN FOR POSS D/C, AWAITING 24HR URINE RESULTS. CALL
LIGHT WITHIN REACH AND SIDE RAILS UP X2. WILL CONT TO MONITOR.

## 2020-04-02 LAB
ANTI-CENTROMERE B ANTIBODIES: <0.2 AI (ref 0–0.9)
ANTI-JO-1: <0.2 AI (ref 0–0.9)
ANTIPROTEINASE 3 (PR-3) ABS: <3.5 U/ML (ref 0–3.5)
ANTIRIBOSOMAL P ANTIBODIES: <0.2 AI (ref 0–0.9)
ANTISCLERODERMA-70 ANTIBODIES: <0.2 AI (ref 0–0.9)
ATYPICAL PANCA: (no result) TITER
CHROMATIN AB SERPL-ACNC: <0.2 AI (ref 0–0.9)
CMV DNA # SPEC NAA+PROBE: <0.2 AI (ref 0–0.9)
CYTOPLASMIC (C-ANCA): (no result) TITER
DSDNA AB SER-ACNC: 15 IU/ML (ref 0–9)
ENA SS-A AB SER-ACNC: 0.3 AI (ref 0–0.9)
ENA SS-B AB SER-ACNC: <0.2 AI (ref 0–0.9)
MYELOPEROXIDASE AB SER IA-ACNC: <9 U/ML (ref 0–9)
PERINUCLEAR (P-ANCA): (no result) TITER
PROT UR-MCNC: 8.3 MG/DL
RNP ANTIBODIES: <0.2 AI (ref 0–0.9)
SMITH ANTIBODIES: <0.2 AI (ref 0–0.9)

## 2020-09-07 ENCOUNTER — HOSPITAL ENCOUNTER (EMERGENCY)
Dept: HOSPITAL 95 - ER | Age: 60
Discharge: HOME | End: 2020-09-07
Payer: COMMERCIAL

## 2020-09-07 VITALS — HEIGHT: 73 IN | BODY MASS INDEX: 26.51 KG/M2 | WEIGHT: 200 LBS

## 2020-09-07 DIAGNOSIS — E11.22: ICD-10-CM

## 2020-09-07 DIAGNOSIS — I25.10: ICD-10-CM

## 2020-09-07 DIAGNOSIS — H57.89: Primary | ICD-10-CM

## 2020-09-07 DIAGNOSIS — I50.22: ICD-10-CM

## 2020-09-07 DIAGNOSIS — N18.3: ICD-10-CM

## 2020-09-07 DIAGNOSIS — Z79.4: ICD-10-CM

## 2020-09-07 DIAGNOSIS — F17.210: ICD-10-CM

## 2020-09-07 DIAGNOSIS — I13.0: ICD-10-CM

## 2020-09-07 DIAGNOSIS — I49.3: ICD-10-CM

## 2020-09-07 DIAGNOSIS — Z79.899: ICD-10-CM

## 2020-09-07 DIAGNOSIS — I48.91: ICD-10-CM

## 2020-09-07 DIAGNOSIS — I25.2: ICD-10-CM

## 2020-10-22 ENCOUNTER — HOSPITAL ENCOUNTER (INPATIENT)
Dept: HOSPITAL 95 - ER | Age: 60
LOS: 3 days | Discharge: HOME | DRG: 291 | End: 2020-10-25
Attending: HOSPITALIST | Admitting: HOSPITALIST
Payer: COMMERCIAL

## 2020-10-22 VITALS — BODY MASS INDEX: 29.22 KG/M2 | WEIGHT: 220.46 LBS | HEIGHT: 73 IN

## 2020-10-22 DIAGNOSIS — R80.9: ICD-10-CM

## 2020-10-22 DIAGNOSIS — I25.10: ICD-10-CM

## 2020-10-22 DIAGNOSIS — G47.33: ICD-10-CM

## 2020-10-22 DIAGNOSIS — F19.10: ICD-10-CM

## 2020-10-22 DIAGNOSIS — Z79.01: ICD-10-CM

## 2020-10-22 DIAGNOSIS — I13.0: Primary | ICD-10-CM

## 2020-10-22 DIAGNOSIS — I25.2: ICD-10-CM

## 2020-10-22 DIAGNOSIS — I50.43: ICD-10-CM

## 2020-10-22 DIAGNOSIS — I48.19: ICD-10-CM

## 2020-10-22 DIAGNOSIS — E11.22: ICD-10-CM

## 2020-10-22 DIAGNOSIS — I42.9: ICD-10-CM

## 2020-10-22 DIAGNOSIS — E87.1: ICD-10-CM

## 2020-10-22 DIAGNOSIS — Z79.4: ICD-10-CM

## 2020-10-22 DIAGNOSIS — N25.81: ICD-10-CM

## 2020-10-22 DIAGNOSIS — N17.9: ICD-10-CM

## 2020-10-22 DIAGNOSIS — N18.32: ICD-10-CM

## 2020-10-22 DIAGNOSIS — E88.09: ICD-10-CM

## 2020-10-22 DIAGNOSIS — E78.5: ICD-10-CM

## 2020-10-22 LAB
BASOPHILS # BLD AUTO: 0.06 K/MM3 (ref 0–0.23)
BASOPHILS NFR BLD AUTO: 1 % (ref 0–2)
DEPRECATED RDW RBC AUTO: 43.5 FL (ref 35.1–46.3)
EOSINOPHIL # BLD AUTO: 0.27 K/MM3 (ref 0–0.68)
EOSINOPHIL NFR BLD AUTO: 2 % (ref 0–6)
ERYTHROCYTE [DISTWIDTH] IN BLOOD BY AUTOMATED COUNT: 12.9 % (ref 11.7–14.2)
HCT VFR BLD AUTO: 48.1 % (ref 37–53)
HGB BLD-MCNC: 15.6 G/DL (ref 13.5–17.5)
IMM GRANULOCYTES # BLD AUTO: 0.04 K/MM3 (ref 0–0.1)
IMM GRANULOCYTES NFR BLD AUTO: 0 % (ref 0–1)
LYMPHOCYTES # BLD AUTO: 5.55 K/MM3 (ref 0.84–5.2)
LYMPHOCYTES NFR BLD AUTO: 45 % (ref 21–46)
MCHC RBC AUTO-ENTMCNC: 32.4 G/DL (ref 31.5–36.5)
MCV RBC AUTO: 92 FL (ref 80–100)
MONOCYTES # BLD AUTO: 1.04 K/MM3 (ref 0.16–1.47)
MONOCYTES NFR BLD AUTO: 8 % (ref 4–13)
NEUTROPHILS # BLD AUTO: 5.45 K/MM3 (ref 1.96–9.15)
NEUTROPHILS NFR BLD AUTO: 44 % (ref 41–73)
NRBC # BLD AUTO: 0 K/MM3 (ref 0–0.02)
NRBC BLD AUTO-RTO: 0 /100 WBC (ref 0–0.2)
PLATELET # BLD AUTO: 184 K/MM3 (ref 150–400)
PROTHROMBIN TIME: 13.6 SEC (ref 9.7–11.5)

## 2020-10-22 PROCEDURE — G0480 DRUG TEST DEF 1-7 CLASSES: HCPCS

## 2020-10-22 PROCEDURE — A9270 NON-COVERED ITEM OR SERVICE: HCPCS

## 2020-10-23 LAB
ALBUMIN SERPL BCP-MCNC: 3.1 G/DL (ref 3.4–5)
ALBUMIN SERPL BCP-MCNC: 3.2 G/DL (ref 3.4–5)
ALBUMIN/GLOB SERPL: 0.8 {RATIO} (ref 0.8–1.8)
ALBUMIN/GLOB SERPL: 0.8 {RATIO} (ref 0.8–1.8)
ALT SERPL W P-5'-P-CCNC: 43 U/L (ref 12–78)
ALT SERPL W P-5'-P-CCNC: 48 U/L (ref 12–78)
AMPHETAMINES UR SCN-MCNC: DETECTED NG/ML
AMPHETAMINES UR-MCNC: DETECTED UG/L
ANION GAP SERPL CALCULATED.4IONS-SCNC: 6 MMOL/L (ref 6–16)
ANION GAP SERPL CALCULATED.4IONS-SCNC: 6 MMOL/L (ref 6–16)
AST SERPL W P-5'-P-CCNC: 39 U/L (ref 12–37)
AST SERPL W P-5'-P-CCNC: 43 U/L (ref 12–37)
BASOPHILS # BLD AUTO: 0.07 K/MM3 (ref 0–0.23)
BASOPHILS NFR BLD AUTO: 1 % (ref 0–2)
BILIRUB SERPL-MCNC: 0.8 MG/DL (ref 0.1–1)
BILIRUB SERPL-MCNC: 0.9 MG/DL (ref 0.1–1)
BUN SERPL-MCNC: 49 MG/DL (ref 8–24)
BUN SERPL-MCNC: 50 MG/DL (ref 8–24)
CALCIUM SERPL-MCNC: 8.5 MG/DL (ref 8.5–10.1)
CALCIUM SERPL-MCNC: 8.6 MG/DL (ref 8.5–10.1)
CHLORIDE SERPL-SCNC: 104 MMOL/L (ref 98–108)
CHLORIDE SERPL-SCNC: 104 MMOL/L (ref 98–108)
CO2 SERPL-SCNC: 27 MMOL/L (ref 21–32)
CO2 SERPL-SCNC: 28 MMOL/L (ref 21–32)
CREAT SERPL-MCNC: 1.74 MG/DL (ref 0.6–1.2)
CREAT SERPL-MCNC: 1.8 MG/DL (ref 0.6–1.2)
DEPRECATED RDW RBC AUTO: 43.4 FL (ref 35.1–46.3)
EOSINOPHIL # BLD AUTO: 0.34 K/MM3 (ref 0–0.68)
EOSINOPHIL NFR BLD AUTO: 3 % (ref 0–6)
ERYTHROCYTE [DISTWIDTH] IN BLOOD BY AUTOMATED COUNT: 12.9 % (ref 11.7–14.2)
ETHANOL SERPL-MCNC: <3 MG/DL
GLOBULIN SER CALC-MCNC: 3.8 G/DL (ref 2.2–4)
GLOBULIN SER CALC-MCNC: 4.1 G/DL (ref 2.2–4)
GLUCOSE SERPL-MCNC: 183 MG/DL (ref 70–99)
GLUCOSE SERPL-MCNC: 247 MG/DL (ref 70–99)
HCT VFR BLD AUTO: 46.2 % (ref 37–53)
HGB BLD-MCNC: 15.1 G/DL (ref 13.5–17.5)
IMM GRANULOCYTES # BLD AUTO: 0.04 K/MM3 (ref 0–0.1)
IMM GRANULOCYTES NFR BLD AUTO: 0 % (ref 0–1)
LYMPHOCYTES # BLD AUTO: 6.56 K/MM3 (ref 0.84–5.2)
LYMPHOCYTES NFR BLD AUTO: 48 % (ref 21–46)
MAGNESIUM SERPL-MCNC: 2 MG/DL (ref 1.6–2.4)
MCHC RBC AUTO-ENTMCNC: 32.7 G/DL (ref 31.5–36.5)
MCV RBC AUTO: 91 FL (ref 80–100)
MONOCYTES # BLD AUTO: 1.37 K/MM3 (ref 0.16–1.47)
MONOCYTES NFR BLD AUTO: 10 % (ref 4–13)
NEUTROPHILS # BLD AUTO: 5.16 K/MM3 (ref 1.96–9.15)
NEUTROPHILS NFR BLD AUTO: 38 % (ref 41–73)
NRBC # BLD AUTO: 0 K/MM3 (ref 0–0.02)
NRBC BLD AUTO-RTO: 0 /100 WBC (ref 0–0.2)
PLATELET # BLD AUTO: 172 K/MM3 (ref 150–400)
POTASSIUM SERPL-SCNC: 3.5 MMOL/L (ref 3.5–5.5)
POTASSIUM SERPL-SCNC: 3.7 MMOL/L (ref 3.5–5.5)
PROT SERPL-MCNC: 6.9 G/DL (ref 6.4–8.2)
PROT SERPL-MCNC: 7.3 G/DL (ref 6.4–8.2)
PROT UR STRIP-MCNC: (no result) MG/DL
SODIUM SERPL-SCNC: 137 MMOL/L (ref 136–145)
SODIUM SERPL-SCNC: 138 MMOL/L (ref 136–145)
SP GR SPEC: 1.02 (ref 1–1.02)
TROPONIN I SERPL-MCNC: 0.06 NG/ML (ref 0–0.04)
UROBILINOGEN UR STRIP-MCNC: (no result) MG/DL

## 2020-10-23 NOTE — NUR
NIGHT SHIFT SUMMARY
PT ADMITTED FROM ED D/T CHF EXACERBATION, PT A&OX4, PLEASANT AND COOPERATIVE
TO CARE. NO C/O PAIN OR ANY DISCOMFORT AT TIME OF ADMISSION, RESTED IN BED T/O
REMAINING OF SHIFT. PT HAS +2 EDEMA ON BLE AND DEPENDENT EDEMA ON ADBOMINAL
AREA. NO SOB NOTED, NO C/O CHEST PAIN, DENIES N&V OR GI PAIN. PT REPORTED
DAILY USE OF METHAMPHETAMINES. NO ACUTE CHANGES NOTED TO MENTATION NOTED.
PT DEMONSTRATED PROPER USE OF CALL LIGHT, INDEPENDENT IN ROOM. VSS, WILL CONT
TO MONITOR PT, WILL REPORT TO ONCOMING RN.

## 2020-10-23 NOTE — NUR
PT AOX4 AND COOPERATIVE OF CARE. PT HAS BEEN IN BED MOST OF THE DAY. PT DOES
GET UP TO GO OUT AND SMOKE. BLADDER SCAN EARLIER SHOWED 472, BUT DUE TO
DESTIENDED ABDOMEN THIS WRITER WAITED FOR ULTRA SOUND TO VERIFY BLADDER
AMOUNT AS PT HAD POSSIBLE ASCITES. ULTRA SOUND CONFIRMED ASCITES AND SHOWED
BLADDER HOLDING 104MLS SO NO MEZA ORDER WAS NEEDED. PT HAS BEEN VOIDING SMALL
AMOUNTS AROUND 100MLS PER VOID. PT DID STATE HE HAD SOME ABDONIMAL PAIN AND
WAS TREATED PER EMAR. NO DISTRESS NOTED AT THIS TIME WILL CONTINUE TO MONITOR.
CALL LIGHT IS WITHIN REACH.

## 2020-10-24 LAB
ALBUMIN SERPL BCP-MCNC: 2.8 G/DL (ref 3.4–5)
ANION GAP SERPL CALCULATED.4IONS-SCNC: 6 MMOL/L (ref 6–16)
BUN SERPL-MCNC: 46 MG/DL (ref 8–24)
CALCIUM SERPL-MCNC: 8.5 MG/DL (ref 8.5–10.1)
CHLORIDE SERPL-SCNC: 108 MMOL/L (ref 98–108)
CO2 SERPL-SCNC: 26 MMOL/L (ref 21–32)
CREAT SERPL-MCNC: 1.57 MG/DL (ref 0.6–1.2)
DEPRECATED RDW RBC AUTO: 41.8 FL (ref 35.1–46.3)
ERYTHROCYTE [DISTWIDTH] IN BLOOD BY AUTOMATED COUNT: 12.8 % (ref 11.7–14.2)
GLUCOSE SERPL-MCNC: 99 MG/DL (ref 70–99)
HCT VFR BLD AUTO: 43.9 % (ref 37–53)
HGB BLD-MCNC: 14.6 G/DL (ref 13.5–17.5)
MAGNESIUM SERPL-MCNC: 2 MG/DL (ref 1.6–2.4)
MCHC RBC AUTO-ENTMCNC: 33.3 G/DL (ref 31.5–36.5)
MCV RBC AUTO: 89 FL (ref 80–100)
NRBC # BLD AUTO: 0 K/MM3 (ref 0–0.02)
NRBC BLD AUTO-RTO: 0 /100 WBC (ref 0–0.2)
PHOSPHATE SERPL-MCNC: 3.7 MG/DL (ref 2.5–4.9)
PLATELET # BLD AUTO: 169 K/MM3 (ref 150–400)
POTASSIUM SERPL-SCNC: 3.6 MMOL/L (ref 3.5–5.5)
PROT UR-MCNC: 8.1 MG/DL (ref 0–11.9)
SODIUM SERPL-SCNC: 140 MMOL/L (ref 136–145)

## 2020-10-24 NOTE — NUR
AM
THE PT IS A/OX3, PLEASANT AND COOPERATIVE, THE PT APPEARS TO BE BREATHING
EASILY ON RA, BREATH SOUNDS WERE CLEAR T/O THIS AM, HOWEVER THE PT STATES THAT
HE STILL FEELS SOB, PTS ABD IS FIRM AND DISTENDED/EDEMETUS, MAY BE COTRIBUTING
TO THE SOB, PT DENIES ANY PAIN AT THIS TIME, CALL LIGHT IN REACH, PT IS UP IND

## 2020-10-24 NOTE — NUR
PT IS A/OX3, PLEASANT AND COOPERATIVE, THE PT IS UP IND IN HIS ROOM AND GOES
OUTSIDE TO SMOKE SEVERAL TIMES DURING THE DAY, THE PT SOMETIMES GOES OUT FOR
AN EXTENDED AMOUNT OF TIME, THE PT WAS ASKED TO LIMIT HIS TIME OUT SIDE DUE TO
THE NEED TO MONITOR HIS HEART RYTHM AND BREATHING, THE PT REPORTED TODAY THAT
HE WAS NOT GETTING AN ADEQUATE AMOUNT OF SLEEP DUE TO PANIC ATTACKS AS HE WAS
TRYING
TO FALL ASLEEP WITH AND WITHOUT THE CPAP, DR. PEPE WAS NOTIFIED ATIVAN WAS
ORDERED AND GIVEN PER THE PTS REQUEST, PT REPORTED THAT HE WOULD LIMIT HIS
TIME SPENT AND AMOUNT OF TIMES OUTSIDE IF HE WAS ABLE TO RELAX, CALL LIGHT IN
REACH, WILL CONTINUE TO MONITOR AND ASSESS FOR CHANGES

## 2020-10-24 NOTE — NUR
SHIFT SUMMARY
ASSUMED CARE OF PT AT 1900. PT IS A/OX4, DENIES N/T IN EXTREMITES BUT HE HAS
+2 PITTING EDEMA. PT ALSO HAS EDEMA IN HIS ABD. TELE SHOWS AFIB AVERAGING
AROUND 100. PT WAS EXPIRIENCING SOB THIS PM, PT STATED THAT HE FELT LIKE HIS
CHEST WAS TOO HEAVY WHEN WOULD LY DOWN TO SLEEP. HOSPITALIST NOTIFIED AND
CPAP WAS ORDERED. PT TOLERATED FOR ABOUT 1 HOUR THEN WAS ABLE TO SLEEP FOR
ABOUT 2 WITHOUT IT. PT DID NOT LIKE WHEN THE OXIMETER WOULD BEEP SO HE RIPPED
IT OFF AND TURNED IT OFF HIMSELF. PT EDUCATED ABOUT WHAT THE MACHINE IS FOR
AND TO ASK FOR HELP. PT URINATES ONLY ABOUT 100CC AT A TIME. PT IS INDEPENDENT
TO BATHROOM. PT ALSO GOES OUT TO SMOKE, PT ADVISED NOT TO SMOKE TO HELP WITH
SOB BUT PT WENT OUT ANYWAYS.
 
CALL LIGHT IN REACH, BED IN LOWEST POSTION.

## 2020-10-25 LAB
ALBUMIN SERPL BCP-MCNC: 2.8 G/DL (ref 3.4–5)
ANION GAP SERPL CALCULATED.4IONS-SCNC: 6 MMOL/L (ref 6–16)
BUN SERPL-MCNC: 43 MG/DL (ref 8–24)
CALCIUM SERPL-MCNC: 8.5 MG/DL (ref 8.5–10.1)
CHLORIDE SERPL-SCNC: 108 MMOL/L (ref 98–108)
CO2 SERPL-SCNC: 28 MMOL/L (ref 21–32)
CREAT SERPL-MCNC: 1.54 MG/DL (ref 0.6–1.2)
GLUCOSE SERPL-MCNC: 86 MG/DL (ref 70–99)
HCT VFR BLD AUTO: 42.3 % (ref 37–53)
HGB BLD-MCNC: 14 G/DL (ref 13.5–17.5)
MAGNESIUM SERPL-MCNC: 2.1 MG/DL (ref 1.6–2.4)
PHOSPHATE SERPL-MCNC: 3.8 MG/DL (ref 2.5–4.9)
POTASSIUM SERPL-SCNC: 3.6 MMOL/L (ref 3.5–5.5)
SODIUM SERPL-SCNC: 142 MMOL/L (ref 136–145)

## 2020-10-25 NOTE — NUR
PT DISCHARGED
THE PT VERBALIZED UNDERSTANDING OF THE DC INSTRUCTIONS, PT APPEARED TO BE
BREATHING EASILY ON RA AT THE TIME OF DC, THE PT DECLINED THE WHEELCHAIR AND
WALKED OUT UNASSISTED TO THE FRONT, THE PT WAS REMINDED TO CALL HIS PCP FOR
FOLLOW UP ON MONDAY TO SCHEDULE AN APPOINTMENT. THE PT WAS REMINDED TO KEEP
HIS APPOINTMENT WITH DR. QUIGLEY

## 2020-10-25 NOTE — NUR
SHIFT SUMMARY
PT UP INDEPENDENTLY. GOING OUTSIDE TO SMOKE A FEW TIMES THROUGHOUT THE NIGHT.
PT CONTINUES TO HAVE SOME SOB. PT REPORTS THAT HE FEELS AS IF HIS SOB IS
MOSTLY RELATED TO HIS ABD PUSHING UP ON HIS LUNGS. PT'S ABD IS FIRM AND
DISTENDED. PT REPORTS THAT AT TIMES HE FEELS AS IF HE HAS TO "GASP FOR AIR".
STATING THAT IT CAUSES HIM TO "HAVE A PANIC ATTACK". ATIVAN GIVEN X 1 THIS
SHIFT. PT APPEARED TO SLEEP WELL FOLLOWING. VITAL SIGNS STABLE. NO ACUTE
CHANGES THIS SHIFT. WILL CONTINUE TO MONITOR AND REPORT TO DAY RN.

## 2020-11-15 ENCOUNTER — HOSPITAL ENCOUNTER (EMERGENCY)
Dept: HOSPITAL 95 - ER | Age: 60
Discharge: HOME | End: 2020-11-15
Payer: COMMERCIAL

## 2020-11-15 VITALS — HEIGHT: 73 IN | BODY MASS INDEX: 29.16 KG/M2 | WEIGHT: 220 LBS

## 2020-11-15 DIAGNOSIS — Z79.899: ICD-10-CM

## 2020-11-15 DIAGNOSIS — I50.22: ICD-10-CM

## 2020-11-15 DIAGNOSIS — I13.0: ICD-10-CM

## 2020-11-15 DIAGNOSIS — E11.22: ICD-10-CM

## 2020-11-15 DIAGNOSIS — E78.5: ICD-10-CM

## 2020-11-15 DIAGNOSIS — I25.10: ICD-10-CM

## 2020-11-15 DIAGNOSIS — Z79.4: ICD-10-CM

## 2020-11-15 DIAGNOSIS — Z79.01: ICD-10-CM

## 2020-11-15 DIAGNOSIS — F17.210: ICD-10-CM

## 2020-11-15 DIAGNOSIS — N18.30: ICD-10-CM

## 2020-11-15 DIAGNOSIS — F32.9: ICD-10-CM

## 2020-11-15 DIAGNOSIS — K40.90: Primary | ICD-10-CM

## 2020-11-15 DIAGNOSIS — I48.91: ICD-10-CM

## 2020-11-15 LAB
ALBUMIN SERPL BCP-MCNC: 3.5 G/DL (ref 3.4–5)
ALBUMIN/GLOB SERPL: 0.8 {RATIO} (ref 0.8–1.8)
ALT SERPL W P-5'-P-CCNC: 52 U/L (ref 12–78)
ANION GAP SERPL CALCULATED.4IONS-SCNC: 5 MMOL/L (ref 6–16)
AST SERPL W P-5'-P-CCNC: 45 U/L (ref 12–37)
BASOPHILS # BLD AUTO: 0.07 K/MM3 (ref 0–0.23)
BASOPHILS NFR BLD AUTO: 1 % (ref 0–2)
BILIRUB SERPL-MCNC: 1 MG/DL (ref 0.1–1)
BUN SERPL-MCNC: 25 MG/DL (ref 8–24)
CALCIUM SERPL-MCNC: 9.4 MG/DL (ref 8.5–10.1)
CHLORIDE SERPL-SCNC: 110 MMOL/L (ref 98–108)
CO2 SERPL-SCNC: 29 MMOL/L (ref 21–32)
CREAT SERPL-MCNC: 1.57 MG/DL (ref 0.6–1.2)
DEPRECATED RDW RBC AUTO: 46.1 FL (ref 35.1–46.3)
EOSINOPHIL # BLD AUTO: 0.27 K/MM3 (ref 0–0.68)
EOSINOPHIL NFR BLD AUTO: 3 % (ref 0–6)
ERYTHROCYTE [DISTWIDTH] IN BLOOD BY AUTOMATED COUNT: 13.8 % (ref 11.7–14.2)
GLOBULIN SER CALC-MCNC: 4.5 G/DL (ref 2.2–4)
GLUCOSE SERPL-MCNC: 163 MG/DL (ref 70–99)
HCT VFR BLD AUTO: 44.8 % (ref 37–53)
HGB BLD-MCNC: 14.7 G/DL (ref 13.5–17.5)
IMM GRANULOCYTES # BLD AUTO: 0.01 K/MM3 (ref 0–0.1)
IMM GRANULOCYTES NFR BLD AUTO: 0 % (ref 0–1)
LYMPHOCYTES # BLD AUTO: 4.01 K/MM3 (ref 0.84–5.2)
LYMPHOCYTES NFR BLD AUTO: 41 % (ref 21–46)
MCHC RBC AUTO-ENTMCNC: 32.8 G/DL (ref 31.5–36.5)
MCV RBC AUTO: 91 FL (ref 80–100)
MONOCYTES # BLD AUTO: 0.81 K/MM3 (ref 0.16–1.47)
MONOCYTES NFR BLD AUTO: 8 % (ref 4–13)
NEUTROPHILS # BLD AUTO: 4.63 K/MM3 (ref 1.96–9.15)
NEUTROPHILS NFR BLD AUTO: 47 % (ref 41–73)
NRBC # BLD AUTO: 0 K/MM3 (ref 0–0.02)
NRBC BLD AUTO-RTO: 0 /100 WBC (ref 0–0.2)
PLATELET # BLD AUTO: 174 K/MM3 (ref 150–400)
POTASSIUM SERPL-SCNC: 3.7 MMOL/L (ref 3.5–5.5)
PROT SERPL-MCNC: 8 G/DL (ref 6.4–8.2)
SODIUM SERPL-SCNC: 144 MMOL/L (ref 136–145)
SP GR SPEC: 1.01 (ref 1–1.02)
UROBILINOGEN UR STRIP-MCNC: (no result) MG/DL

## 2020-11-16 ENCOUNTER — HOSPITAL ENCOUNTER (OUTPATIENT)
Dept: HOSPITAL 95 - ATC | Age: 60
Discharge: HOME | End: 2020-11-16
Attending: NURSE PRACTITIONER
Payer: COMMERCIAL

## 2020-11-16 DIAGNOSIS — Z79.4: ICD-10-CM

## 2020-11-16 DIAGNOSIS — G47.33: ICD-10-CM

## 2020-11-16 DIAGNOSIS — E11.22: ICD-10-CM

## 2020-11-16 DIAGNOSIS — N18.30: ICD-10-CM

## 2020-11-16 DIAGNOSIS — Z79.899: ICD-10-CM

## 2020-11-16 DIAGNOSIS — R18.8: Primary | ICD-10-CM

## 2020-11-16 DIAGNOSIS — Z51.5: ICD-10-CM

## 2020-11-16 DIAGNOSIS — F17.210: ICD-10-CM

## 2020-11-16 DIAGNOSIS — I42.9: ICD-10-CM

## 2020-11-16 DIAGNOSIS — I13.0: ICD-10-CM

## 2020-11-16 DIAGNOSIS — I50.22: ICD-10-CM

## 2020-11-16 DIAGNOSIS — I48.91: ICD-10-CM

## 2020-11-16 PROCEDURE — P9046 ALBUMIN (HUMAN), 25%, 20 ML: HCPCS

## 2020-11-23 ENCOUNTER — HOSPITAL ENCOUNTER (OUTPATIENT)
Dept: HOSPITAL 95 - US | Age: 60
Discharge: HOME | End: 2020-11-23
Attending: NURSE PRACTITIONER
Payer: COMMERCIAL

## 2020-11-23 DIAGNOSIS — Z79.01: ICD-10-CM

## 2020-11-23 DIAGNOSIS — G47.33: ICD-10-CM

## 2020-11-23 DIAGNOSIS — Z86.73: ICD-10-CM

## 2020-11-23 DIAGNOSIS — Z79.4: ICD-10-CM

## 2020-11-23 DIAGNOSIS — Z79.899: ICD-10-CM

## 2020-11-23 DIAGNOSIS — I48.91: ICD-10-CM

## 2020-11-23 DIAGNOSIS — Z51.5: ICD-10-CM

## 2020-11-23 DIAGNOSIS — I50.20: ICD-10-CM

## 2020-11-23 DIAGNOSIS — F17.210: ICD-10-CM

## 2020-11-23 DIAGNOSIS — R18.8: Primary | ICD-10-CM

## 2020-11-23 DIAGNOSIS — I25.2: ICD-10-CM

## 2020-11-23 DIAGNOSIS — E11.9: ICD-10-CM

## 2020-12-07 ENCOUNTER — HOSPITAL ENCOUNTER (OUTPATIENT)
Dept: HOSPITAL 95 - US | Age: 60
Discharge: HOME | End: 2020-12-07
Attending: NURSE PRACTITIONER
Payer: COMMERCIAL

## 2020-12-07 DIAGNOSIS — R18.8: Primary | ICD-10-CM

## 2020-12-07 DIAGNOSIS — Z79.899: ICD-10-CM

## 2021-01-07 ENCOUNTER — HOSPITAL ENCOUNTER (OUTPATIENT)
Dept: HOSPITAL 95 - US | Age: 61
Discharge: HOME | End: 2021-01-07
Attending: NURSE PRACTITIONER
Payer: COMMERCIAL

## 2021-01-07 DIAGNOSIS — R18.8: Primary | ICD-10-CM

## 2021-01-07 DIAGNOSIS — Z79.4: ICD-10-CM

## 2021-01-07 DIAGNOSIS — Z79.899: ICD-10-CM

## 2021-01-18 ENCOUNTER — HOSPITAL ENCOUNTER (OUTPATIENT)
Dept: HOSPITAL 95 - ATC | Age: 61
Discharge: HOME | End: 2021-01-18
Attending: NURSE PRACTITIONER
Payer: COMMERCIAL

## 2021-01-18 DIAGNOSIS — N18.30: ICD-10-CM

## 2021-01-18 DIAGNOSIS — F17.210: ICD-10-CM

## 2021-01-18 DIAGNOSIS — I50.22: ICD-10-CM

## 2021-01-18 DIAGNOSIS — I48.91: ICD-10-CM

## 2021-01-18 DIAGNOSIS — Z79.4: ICD-10-CM

## 2021-01-18 DIAGNOSIS — Z79.01: ICD-10-CM

## 2021-01-18 DIAGNOSIS — G47.33: ICD-10-CM

## 2021-01-18 DIAGNOSIS — R18.8: Primary | ICD-10-CM

## 2021-01-18 DIAGNOSIS — Z79.899: ICD-10-CM

## 2021-01-18 DIAGNOSIS — E11.22: ICD-10-CM

## 2021-01-18 DIAGNOSIS — I42.9: ICD-10-CM

## 2021-01-28 ENCOUNTER — HOSPITAL ENCOUNTER (OUTPATIENT)
Dept: HOSPITAL 95 - US | Age: 61
Discharge: HOME | End: 2021-01-28
Attending: NURSE PRACTITIONER
Payer: COMMERCIAL

## 2021-01-28 DIAGNOSIS — E11.22: ICD-10-CM

## 2021-01-28 DIAGNOSIS — F17.210: ICD-10-CM

## 2021-01-28 DIAGNOSIS — N18.30: ICD-10-CM

## 2021-01-28 DIAGNOSIS — R18.8: Primary | ICD-10-CM

## 2021-01-28 DIAGNOSIS — I50.22: ICD-10-CM

## 2021-01-28 DIAGNOSIS — I48.91: ICD-10-CM

## 2021-02-08 ENCOUNTER — HOSPITAL ENCOUNTER (OUTPATIENT)
Dept: HOSPITAL 95 - ATC | Age: 61
Discharge: HOME | End: 2021-02-08
Attending: NURSE PRACTITIONER
Payer: COMMERCIAL

## 2021-02-08 DIAGNOSIS — I50.22: ICD-10-CM

## 2021-02-08 DIAGNOSIS — E11.22: ICD-10-CM

## 2021-02-08 DIAGNOSIS — I48.91: ICD-10-CM

## 2021-02-08 DIAGNOSIS — R18.8: Primary | ICD-10-CM

## 2021-02-08 DIAGNOSIS — N18.30: ICD-10-CM

## 2021-02-08 DIAGNOSIS — F17.210: ICD-10-CM

## 2021-02-18 ENCOUNTER — HOSPITAL ENCOUNTER (OUTPATIENT)
Dept: HOSPITAL 95 - ATC | Age: 61
Discharge: HOME | End: 2021-02-18
Attending: NURSE PRACTITIONER
Payer: COMMERCIAL

## 2021-02-18 DIAGNOSIS — N18.30: ICD-10-CM

## 2021-02-18 DIAGNOSIS — R18.8: Primary | ICD-10-CM

## 2021-02-18 DIAGNOSIS — E11.22: ICD-10-CM

## 2021-02-18 DIAGNOSIS — I50.22: ICD-10-CM

## 2021-02-18 DIAGNOSIS — I48.91: ICD-10-CM

## 2021-02-18 DIAGNOSIS — F17.210: ICD-10-CM

## 2021-03-15 ENCOUNTER — HOSPITAL ENCOUNTER (INPATIENT)
Dept: HOSPITAL 95 - ER | Age: 61
LOS: 13 days | Discharge: HOME HEALTH | DRG: 65 | End: 2021-03-28
Attending: INTERNAL MEDICINE | Admitting: HOSPITALIST
Payer: COMMERCIAL

## 2021-03-15 VITALS — BODY MASS INDEX: 23.6 KG/M2 | WEIGHT: 189.82 LBS | HEIGHT: 75 IN

## 2021-03-15 DIAGNOSIS — F32.9: ICD-10-CM

## 2021-03-15 DIAGNOSIS — I25.2: ICD-10-CM

## 2021-03-15 DIAGNOSIS — I25.10: ICD-10-CM

## 2021-03-15 DIAGNOSIS — E87.6: ICD-10-CM

## 2021-03-15 DIAGNOSIS — G47.33: ICD-10-CM

## 2021-03-15 DIAGNOSIS — I42.8: ICD-10-CM

## 2021-03-15 DIAGNOSIS — I63.9: Primary | ICD-10-CM

## 2021-03-15 DIAGNOSIS — I50.22: ICD-10-CM

## 2021-03-15 DIAGNOSIS — Z86.73: ICD-10-CM

## 2021-03-15 DIAGNOSIS — I48.91: ICD-10-CM

## 2021-03-15 DIAGNOSIS — B19.20: ICD-10-CM

## 2021-03-15 DIAGNOSIS — F17.210: ICD-10-CM

## 2021-03-15 DIAGNOSIS — I48.11: ICD-10-CM

## 2021-03-15 DIAGNOSIS — E11.65: ICD-10-CM

## 2021-03-15 DIAGNOSIS — Z79.899: ICD-10-CM

## 2021-03-15 DIAGNOSIS — Z79.4: ICD-10-CM

## 2021-03-15 DIAGNOSIS — F15.10: ICD-10-CM

## 2021-03-15 DIAGNOSIS — N18.32: ICD-10-CM

## 2021-03-15 DIAGNOSIS — E11.22: ICD-10-CM

## 2021-03-15 DIAGNOSIS — Z91.14: ICD-10-CM

## 2021-03-15 DIAGNOSIS — Z98.890: ICD-10-CM

## 2021-03-15 DIAGNOSIS — I51.3: ICD-10-CM

## 2021-03-15 DIAGNOSIS — G93.49: ICD-10-CM

## 2021-03-15 LAB
ALBUMIN SERPL BCP-MCNC: 3.3 G/DL (ref 3.4–5)
ALBUMIN/GLOB SERPL: 0.8 {RATIO} (ref 0.8–1.8)
ALT SERPL W P-5'-P-CCNC: 74 U/L (ref 12–78)
AMPHETAMINES UR-MCNC: DETECTED UG/L
ANION GAP SERPL CALCULATED.4IONS-SCNC: 8 MMOL/L (ref 6–16)
AST SERPL W P-5'-P-CCNC: 44 U/L (ref 12–37)
BASOPHILS # BLD AUTO: 0.04 K/MM3 (ref 0–0.23)
BASOPHILS NFR BLD AUTO: 0 % (ref 0–2)
BILIRUB SERPL-MCNC: 2 MG/DL (ref 0.1–1)
BUN SERPL-MCNC: 46 MG/DL (ref 8–24)
CALCIUM SERPL-MCNC: 8.8 MG/DL (ref 8.5–10.1)
CHLORIDE SERPL-SCNC: 101 MMOL/L (ref 98–108)
CO2 SERPL-SCNC: 27 MMOL/L (ref 21–32)
CREAT SERPL-MCNC: 1.88 MG/DL (ref 0.6–1.2)
DEPRECATED RDW RBC AUTO: 50.7 FL (ref 35.1–46.3)
EOSINOPHIL # BLD AUTO: 0.08 K/MM3 (ref 0–0.68)
EOSINOPHIL NFR BLD AUTO: 1 % (ref 0–6)
ERYTHROCYTE [DISTWIDTH] IN BLOOD BY AUTOMATED COUNT: 14.2 % (ref 11.7–14.2)
GLOBULIN SER CALC-MCNC: 4.2 G/DL (ref 2.2–4)
GLUCOSE SERPL-MCNC: 357 MG/DL (ref 70–99)
GLUCOSE UR-MCNC: (no result) MG/DL
HCT VFR BLD AUTO: 47 % (ref 37–53)
HGB BLD-MCNC: 15.2 G/DL (ref 13.5–17.5)
IMM GRANULOCYTES # BLD AUTO: 0.04 K/MM3 (ref 0–0.1)
IMM GRANULOCYTES NFR BLD AUTO: 0 % (ref 0–1)
LYMPHOCYTES # BLD AUTO: 2.62 K/MM3 (ref 0.84–5.2)
LYMPHOCYTES NFR BLD AUTO: 23 % (ref 21–46)
MCHC RBC AUTO-ENTMCNC: 32.3 G/DL (ref 31.5–36.5)
MCV RBC AUTO: 97 FL (ref 80–100)
MONOCYTES # BLD AUTO: 1.24 K/MM3 (ref 0.16–1.47)
MONOCYTES NFR BLD AUTO: 11 % (ref 4–13)
NEUTROPHILS # BLD AUTO: 7.24 K/MM3 (ref 1.96–9.15)
NEUTROPHILS NFR BLD AUTO: 64 % (ref 41–73)
NRBC # BLD AUTO: 0 K/MM3 (ref 0–0.02)
NRBC BLD AUTO-RTO: 0 /100 WBC (ref 0–0.2)
PLATELET # BLD AUTO: 197 K/MM3 (ref 150–400)
POTASSIUM SERPL-SCNC: 4 MMOL/L (ref 3.5–5.5)
PROT SERPL-MCNC: 7.5 G/DL (ref 6.4–8.2)
PROTHROMBIN TIME: 14 SEC (ref 9.7–11.5)
SODIUM SERPL-SCNC: 136 MMOL/L (ref 136–145)
SP GR SPEC: 1 (ref 1–1.02)
UROBILINOGEN UR STRIP-MCNC: (no result) MG/DL

## 2021-03-15 PROCEDURE — A9270 NON-COVERED ITEM OR SERVICE: HCPCS

## 2021-03-15 PROCEDURE — G0378 HOSPITAL OBSERVATION PER HR: HCPCS

## 2021-03-15 PROCEDURE — G0480 DRUG TEST DEF 1-7 CLASSES: HCPCS

## 2021-03-16 LAB
ALBUMIN SERPL BCP-MCNC: 3.1 G/DL (ref 3.4–5)
ALBUMIN/GLOB SERPL: 0.8 {RATIO} (ref 0.8–1.8)
ALT SERPL W P-5'-P-CCNC: 66 U/L (ref 12–78)
ANION GAP SERPL CALCULATED.4IONS-SCNC: 9 MMOL/L (ref 6–16)
AST SERPL W P-5'-P-CCNC: 34 U/L (ref 12–37)
BASOPHILS # BLD AUTO: 0.04 K/MM3 (ref 0–0.23)
BASOPHILS NFR BLD AUTO: 0 % (ref 0–2)
BILIRUB SERPL-MCNC: 1.5 MG/DL (ref 0.1–1)
BUN SERPL-MCNC: 43 MG/DL (ref 8–24)
CALCIUM SERPL-MCNC: 8.4 MG/DL (ref 8.5–10.1)
CHLORIDE SERPL-SCNC: 104 MMOL/L (ref 98–108)
CO2 SERPL-SCNC: 30 MMOL/L (ref 21–32)
CREAT SERPL-MCNC: 1.74 MG/DL (ref 0.6–1.2)
DEPRECATED RDW RBC AUTO: 49.3 FL (ref 35.1–46.3)
EOSINOPHIL # BLD AUTO: 0.12 K/MM3 (ref 0–0.68)
EOSINOPHIL NFR BLD AUTO: 1 % (ref 0–6)
ERYTHROCYTE [DISTWIDTH] IN BLOOD BY AUTOMATED COUNT: 14 % (ref 11.7–14.2)
GLOBULIN SER CALC-MCNC: 3.8 G/DL (ref 2.2–4)
GLUCOSE SERPL-MCNC: 194 MG/DL (ref 70–99)
HCT VFR BLD AUTO: 43.8 % (ref 37–53)
HGB BLD-MCNC: 14.1 G/DL (ref 13.5–17.5)
IMM GRANULOCYTES # BLD AUTO: 0.04 K/MM3 (ref 0–0.1)
IMM GRANULOCYTES NFR BLD AUTO: 0 % (ref 0–1)
LYMPHOCYTES # BLD AUTO: 3.11 K/MM3 (ref 0.84–5.2)
LYMPHOCYTES NFR BLD AUTO: 29 % (ref 21–46)
MCHC RBC AUTO-ENTMCNC: 32.2 G/DL (ref 31.5–36.5)
MCV RBC AUTO: 96 FL (ref 80–100)
MONOCYTES # BLD AUTO: 0.98 K/MM3 (ref 0.16–1.47)
MONOCYTES NFR BLD AUTO: 9 % (ref 4–13)
NEUTROPHILS # BLD AUTO: 6.59 K/MM3 (ref 1.96–9.15)
NEUTROPHILS NFR BLD AUTO: 61 % (ref 41–73)
NRBC # BLD AUTO: 0 K/MM3 (ref 0–0.02)
NRBC BLD AUTO-RTO: 0 /100 WBC (ref 0–0.2)
PLATELET # BLD AUTO: 184 K/MM3 (ref 150–400)
POTASSIUM SERPL-SCNC: 3.1 MMOL/L (ref 3.5–5.5)
PROT SERPL-MCNC: 6.9 G/DL (ref 6.4–8.2)
SODIUM SERPL-SCNC: 143 MMOL/L (ref 136–145)

## 2021-03-16 NOTE — NUR
RECEIVED RETURN PHONE CALL FROM YOAN HERNANDEZ - NO ANTICOAGULATION, AS RISK
FOR HEMORRHAGIC STROKE.  PT HERE WITH NON-HEMORRHAGIC STROKE.  SHE REQUESTED
AM HOSPITALIST BE UPDATED IN AM REGARDING L VENT THROMBUS - NURSING ORDER
PLACED AND WILL UPDATE AM SHIFT WITH THIS REQUEST.

## 2021-03-16 NOTE — NUR
SHIFT SUMMARY
 
PT RESTED THROUGH NIGHT. PT ALERT. DOES NOT USE CALL LIGHT, AND IS IMPULSIVE.
SATS >90% ON ROOM AIR. TELE AFIB 90'S-110'S - DIGOXIN AND METOPROLOL ORDERED.
PT IS ABLE TO STAND UP AND AMBULATE STEADILY TO BATHROOM, BUT NEEDS ASSISTANCE
AND FOR STAFF TO BE DIRECT WITH HIM. PT DID NOT CLEAN HIMSELF AFTER HAVING
BOWEL MOVEMENT, AND HAD STOOL ALL OVER BOTTOM. PT ABLE TO UNERSTAND WHEN BEING
SPOKEN TO, BUT IS HAVING DIFFICULTY HAVING APPROPRIATE RESPONSES IN RETURN.
VOIDING TO BATHROOM, TWO BM'S. NO C/O PAIN. VSS.
 
CALL LIGHT WITHIN REACH, BED ALARM ON AND BED IN LOWEST POSITION. WILL
CONTINUE TO MONITOR.

## 2021-03-16 NOTE — NUR
PT IS ALERT TO PERSON, FOLLOWS DIRECTION.  PT HAS EXPRESSIVE APHASIA.  PT IS
IMPULSIVE, BED ALARM ON.  REVIEWED CALL BELL, AND BED ALARM BEING ON FOR
SAFETY.  PT AMBULATED TO BR, STEADY ON HIS FEET.  PT DENIES ANY COMPLAINTS OF
HEADACHE, CHEST PAIN, SOB, NAUSEA, NUMBNESS OR TINGLING.  CALL LIGHT WITHIN
REACH.  BED IN LOW POSITION.  ERNESTINE.

## 2021-03-16 NOTE — NUR
SHIFT SUMMARY
 
NO ACUTE EVENTS THIS SHIFT, VSS. PATIENT IS ALERT, HOWEVER UNABLE TO
APPROPRIATELY ANSWER QUESTIONS AT TIMES, APPEARS ATTENTIVE TO STAFF'S
CONVERSATION. BED ALARM ON, PATIENT IS IMPULSIVE AT TIMES. PATIENT IS A
STANDBY ASSIST TO THE BATHROOM FOR LINE MANAGEMENT.  PATIENT
WORKED WITH PT/OT/ST TODAY.  SOME APPARENT EXPRESSIVE APHASIA. NO PHYSICAL
DEFICITS NOTED.

## 2021-03-16 NOTE — NUR
RECEIVED UPDATE FROM GEMA HER THAT SHE JUST SPOKE TO DR. ART - REPORT
RECEIVED FROM DR. ART IS PT HAS A L) VENT THROMBUS, AND EF IS 10-15%.
I CONTACTED YOAN HERNANDEZ, UPDATED ON THE ABOVE INFORMATION, RELAYED PT IS
CURRENTLY ON ASA, AND LOVENOX, WAS METH POSITIVE, PER PREVIOUS SHIFT.  REPORT
FROM PREVIOUS SHIFT RN, ALLI - IS PT HASN'T BEEN TAKING HIS XARELTO FOR 1
MONTH.  I RELAYED THIS INFORMATION TO YOAN HERNANDEZ.  SHE REPORTED SHE WOULD TALK
TO DR. BOYKIN AND GET BACK TO ME REGARDING POTENTIAL ORDERS.

## 2021-03-16 NOTE — NUR
Advance Directive (AD) Education attemted/ SPiritual care visit Conducted.
Upon recieving an admit referral for AD education, I visit patient. I ask
patient about interest in AD. Patient stares at me and has no reply. Patient
answers yes to having good support and and yes to holding up ok  and yes to
prayer. I gladly provide prayer and a calming presence. I will continue to
remain available to patient and family.

## 2021-03-17 LAB
ANION GAP SERPL CALCULATED.4IONS-SCNC: 6 MMOL/L (ref 6–16)
BUN SERPL-MCNC: 37 MG/DL (ref 8–24)
CALCIUM SERPL-MCNC: 8.2 MG/DL (ref 8.5–10.1)
CHLORIDE SERPL-SCNC: 110 MMOL/L (ref 98–108)
CO2 SERPL-SCNC: 28 MMOL/L (ref 21–32)
CREAT SERPL-MCNC: 1.42 MG/DL (ref 0.6–1.2)
GLUCOSE SERPL-MCNC: 159 MG/DL (ref 70–99)
POTASSIUM SERPL-SCNC: 3.4 MMOL/L (ref 3.5–5.5)
SODIUM SERPL-SCNC: 144 MMOL/L (ref 136–145)

## 2021-03-17 NOTE — NUR
I WILL REPORT OFF TO ONCOMING SHIFT TO CONTACT AM HOSPITALIST WITH ECHO RESULT
OF LEFT VENT THROMBUS, WHICH WAS REPORTED TO NURSING STAFF BY DR. ART.

## 2021-03-17 NOTE — NUR
SHIFT SUMMARY - NO ACUTE CHANGES THROUGHOUT THIS SHIFT.  BED ALARM REMAINS ON,
HOWEVER PT IS IMPULSIVE, AND GETS OOB BEFORE STAFF IS PRESENT.  PT HAS
BEEN EDUCATED ON USE OF CALL LAKE, HOWEVER PT DOESN'T DEMONSTRATE USE.  PT IS
STEADY ON HIS FEET, SBA TO BRP.  PT DID FOLLOW INSTRUCTION X1 WITH USE OF
URINAL.  PT CONTINUES WITH EXPRESSIVE APHASIA.  VSS.  PT DENIED ANY COMPLAINTS
OF PAIN, CHEST PAIN, OR DISCOMFORT THROUGHOUT THE NIGHT.  PT TOLERATED PO
FLUIDS AND CHEESE SNACK LAST NOC.  FLUIDS AT BEDSIDE.  CALL LIGHT WITHIN
REACH.  BED IN LOW POSITION.

## 2021-03-17 NOTE — NUR
ARRIVES FROM [facility] AT 1830. DOES NOT KNOW WHY HERE.GIRLFRIEND IN ROOM
KNOWS BIRTH YEAR. REVIEW WHY HE NEEDS TO USE CALL LIGHT. REPORT TO NIGHT RN

## 2021-03-17 NOTE — NUR
NO ACUTE EVENTS THIS SHIFT, VSS. PATIENT STILL UNABLE TO STATE RESPONSES TO
ORIENTATION QUESTIONS INCLUDING PLACE AND SITUATION. ABLE TO AMBULATE
INDEPENDENTLY IN ROOM, BED/CHAIR ALARM ARE ON DUE TO IMPULSIVITY. CBG COVERED
PER EMAR. PATIENT IS ABLE TO EAT MEALS INDEPENDENTLY. GIRLFRIEND SAEID IS AT
BEDSIDE, SHE STATES THAT HE DOESN'T SEEM TO BE ABLE TO STATE WHO SHE IS, BUT
SEEMS TO KNOW HER. DR. ART SAW PATIENT AT BEDSIDE, TO DISCUSS CARDIOLOGY
RECS WITH HOSPITALIST.

## 2021-03-18 LAB
ANION GAP SERPL CALCULATED.4IONS-SCNC: 6 MMOL/L (ref 6–16)
BUN SERPL-MCNC: 32 MG/DL (ref 8–24)
CALCIUM SERPL-MCNC: 8.2 MG/DL (ref 8.5–10.1)
CHLORIDE SERPL-SCNC: 109 MMOL/L (ref 98–108)
CO2 SERPL-SCNC: 27 MMOL/L (ref 21–32)
CREAT SERPL-MCNC: 1.49 MG/DL (ref 0.6–1.2)
GLUCOSE SERPL-MCNC: 151 MG/DL (ref 70–99)
POTASSIUM SERPL-SCNC: 3.5 MMOL/L (ref 3.5–5.5)
SODIUM SERPL-SCNC: 142 MMOL/L (ref 136–145)

## 2021-03-18 NOTE — NUR
SHIFT SUMMARYASSUMED CARE OF PT AT 1900. PT IS A/OX2. PT IS IMPULSIZE AND WILL
GET UP WITHOUT HELP TO BATHROOM. HEART SOUNDS IRREGULAR, TELE SHOWS AFIB, RATE
BETWEEN . LUNG SOUNDS ARE VERY DIMINISHED. PT IS SBA TO BATHROOM.
 
CALL LIGHT IN REACH, BED IN LOWEST POSTION.

## 2021-03-18 NOTE — NUR
SHIFT SUMMARY-
 
PT A/O TO SELF ONLY. PT WITH EXPRESSIVE APHASIA, PT WILL SHAKE HEAD YES/NO BUT
NOT ALWAYS APROPRIATELY. AT TIMES PT WILL ANSWER WITH SIMPLE WORDS. PT DOES
NOT FOLLOW DIRECTION, IMPULSIVE AND DOES NOT CALL APROPRIATELY BUT WITH STEADY
GAIT MOST OF THE TIME. LS CLEAR, ON RA. HEART IRREG, TELE AFIB 110'S. PT WITH
GOOD APETITE. SIGNIFICANT OTHER IN TO SEE PT THIS EVENING, DR BASS CALLED TO
DISCUSS MRI RESULTS WITH HER. NO OTHER ACUTE CHANGES THIS SHIFT.

## 2021-03-19 LAB
ANION GAP SERPL CALCULATED.4IONS-SCNC: 7 MMOL/L (ref 6–16)
BUN SERPL-MCNC: 32 MG/DL (ref 8–24)
CALCIUM SERPL-MCNC: 8.2 MG/DL (ref 8.5–10.1)
CHLORIDE SERPL-SCNC: 109 MMOL/L (ref 98–108)
CO2 SERPL-SCNC: 26 MMOL/L (ref 21–32)
CREAT SERPL-MCNC: 1.23 MG/DL (ref 0.6–1.2)
GLUCOSE SERPL-MCNC: 126 MG/DL (ref 70–99)
POTASSIUM SERPL-SCNC: 3.5 MMOL/L (ref 3.5–5.5)
SODIUM SERPL-SCNC: 142 MMOL/L (ref 136–145)

## 2021-03-19 NOTE — NUR
CALLED DR VALLE AND INFORMED HIM OF APNEIC PAUSES OF ABOUT 30 SECONDS, VSS,
SATS STABLE ON RA.  DR VALLE DECLINES TO DO VBG AT THIS TIME, BUT HE WILL COME
AND LOOK AT HIM

## 2021-03-19 NOTE — NUR
SHIFT SUMMARY
ASSUMED CARE OF PT AT 1900. PT IS A/OX1. PT WILL ANSWER YES AND NO QUESTIONS
BUT HAS HARD TIME SAYING WORDS. HEART SOUNDS IRREGULAR, TELE SHOWS AFIB
BETWEEN , PT REACHED 140S WITH ACTITY AND WOULD BE SOB WHEN RETURNING TO
BED AFTER VOIDING. LUNG SOUNDS CLEAR. PT IS SBA TO BATHROOM.
 
NO ACUTE EVENTS. CALL LIGHT IN REACH, BED IN LOWEST POSTION.

## 2021-03-19 NOTE — NUR
SHIFT SUMMARY
CONNER WAS STARTED ON A HEPARIN DRIP.  LESS VERBAL TODAY PER S.O.  MOSTLY SAYS
'YES' OR 'NO' OR OCCASIONALLY SHORT SENTENCES. SEEMS DISORIENTED.  CONTINUALLY
SETS OFF BED ALARM TO GO TO BR, SBA TO BR.  TELE SHOWED AFIB.  CT DONE AFTER
HEP DRIP STARTED WHICH SHOWED MINOR INCREASE OF PETICH BLEEDING, DR EDDIE BURGERORMED.  APNEIC BREATHING , DR VALLE AWARE.  DENIED PAIN.  TOOK MEDS AS
PRESCRIBED.  CALL LIGHT IN REACH, [facility]

## 2021-03-20 LAB
ANION GAP SERPL CALCULATED.4IONS-SCNC: 6 MMOL/L (ref 6–16)
BUN SERPL-MCNC: 34 MG/DL (ref 8–24)
CALCIUM SERPL-MCNC: 8.2 MG/DL (ref 8.5–10.1)
CHLORIDE SERPL-SCNC: 105 MMOL/L (ref 98–108)
CO2 SERPL-SCNC: 27 MMOL/L (ref 21–32)
CREAT SERPL-MCNC: 1.56 MG/DL (ref 0.6–1.2)
GLUCOSE SERPL-MCNC: 95 MG/DL (ref 70–99)
PLATELET # BLD AUTO: 165 K/MM3 (ref 150–400)
POTASSIUM SERPL-SCNC: 3.9 MMOL/L (ref 3.5–5.5)
SODIUM SERPL-SCNC: 138 MMOL/L (ref 136–145)

## 2021-03-20 NOTE — NUR
SHIFT SUMMARY:
60 MALE RESTED COMFORTABLY IN BED; PT HAS EXPRESSIVE APHASIA AT TIMES; PT ABLE
TO MOVE ALL EXTREMITIES AND AMBULATE BATHROOM AND BACK WITH GAIT SLIGHTLY
UNSTEADY; ABLE TO FOLLOW SIMPLE VERBAL COMMANDS AND SWALLOW FLUIDS WITHOUT
ISSUE; TELEMETRY REFLECTS A/FIB WITH HEPARIN GTT INFUSING AT 18ML/HR WITH NEXT
PTT DRAW AT 0800; PT STILL TENDS TO BE IMPULSIVE AT TIMES WHEN UTILIZING
RESTROOM WITH BED ALARM SET OFF; PT BED ALARM APPLIED, BED LOW POSITION WITH
CALL LIGHT AT SIDE.

## 2021-03-20 NOTE — NUR
SHIFT SUMMARY
CONNER REMAINED ON HEPARIN DRIP.  TOOK A SHOWER, S.O. VISITED.  NEURO CHECKS
UNCHANGED, STILL NO MOTOR DEFICIT EXCEPT IMPAIRED BALANCE.  APHASIC AND
CONFUSED.  CT SHOWED NO INCREASED BLEEDING IN BREAIN.  SOB ON EXERTION.  TELE
DC'D PER DR SPEARS, AS HE HAS CONTINUOUSLY BEEN AFIB.  CALL LIGHT IN REACH,
SCHEDULED TOILETING PERFORMED, [facility]

## 2021-03-21 LAB — PROTHROMBIN TIME: 13.4 SEC (ref 9.7–11.5)

## 2021-03-21 NOTE — NUR
PT AOX2 WITH NO ACUTE CHANGES TODAY. PT IS STILL VERY IMPULSIVE AND DOES NOT
USE CALL LIGHT. PT WILL MANY TIMES NOT TALK WHEN TALKED TO AND THEN WILL TALK
FINE. PT DENIES ANY PAIN. CALL LIGHT IS WITHIN REACH AND BED ALARM IN PLACE.
WILL CONTINUE TO MONITOR.

## 2021-03-21 NOTE — NUR
SHIFT SUMMARY
PATIENT HAD NO ACUTE CHANGES OBSERVED. AXOX 2 AND ONE ASSIST TO BSC. SOB
W/EXERTION. MOVES ALL EXTREMITIES WELL. PIV REMAINS INTACT. HEPARIN INFUSING
AT 34.4 mL/HR MANAGE BY PHARMACY. . VSS/AFEBRILE. DENIES PAIN AND N/V.
CALL LIGHT IN REACH. BED IN LOWEST POSITION. WILL CONTINUE TO MONITOR UNTIL
DAY SHIFT NURSE ASSUMES CARE.

## 2021-03-22 LAB
PLATELET # BLD AUTO: 156 K/MM3 (ref 150–400)
PROTHROMBIN TIME: 13.9 SEC (ref 9.7–11.5)

## 2021-03-22 NOTE — NUR
SHIFT SUMMARY
PATIENT HAD NO ACUTE CHANGES OBSERVED. AXOX 2 AND SBA TO BR WITH UNSTEADY
GAIT. MOVES ALL EXTREMITIES. PIV REMAINS INTACT. IV HEPARIN INFUSING AT 34.4
mL/HR MANAGE BY PHARMACY. NO RATE CHANGE. VSS/AFEBRILE. DENIES PAIN, SOB, AND
N/V. MOSTLY NON-VERBAL WHEN ASKED QUESTIONS OR LIMITED ANSWERS. IMPULSIVE WITH
BED ALARM ACTIVATED X FOUR. . CALL LIGHT IN REACH. BED IN LOWEST
POSITION. WILL CONTINUE TO MONITOR UNTIL DAY SHIFT NURSE ASSUMES CARE.

## 2021-03-22 NOTE — NUR
SHIFT SUMMARY
CONNER CONTINUED TO HAVE NO MUSCULAR DEFICITS BUT HAS SEVERE APHASIA MAKING IT
DIFFICULT TO DETERMINE HIS MENTATION.  UNABLE TO INTAKE INFORMATION AND MAKE A
DECISION.  ASKED IF HE IS HAVING PAIN, AND HE WILL SAY 'NO', DESPITE HAVING
JUST DISCUSSED HIS PAIN.  R GROIN TENDER TO THE TOUCH, PER GF HE WAS GROANING
IN PAIN IN THE SHOWER.  HEP DRIP REMAINED STABLE THIS SHIFT.  CONTINENT IN BR,
SET OFF BED ALARM SEVERAL TIMES.  HAD BM.  CALL LIGHT IN REACH, [facility]

## 2021-03-23 LAB — PROTHROMBIN TIME: 13.7 SEC (ref 9.7–11.5)

## 2021-03-23 NOTE — NUR
SHIFT SUMMARY-
PT. A&OX1 WITH EXPRESSIVE APHASIA. DOES NOT ANSWER QUESTIONS APPROPRIATELY AND
STATES ONLY ONE WORD ANSWERS. PT. IMPULSIVE T/O THE NIGHT, MINIMAL WEAKNESS.
SBA TO BATHROOM. ON HEPARIN GTT, NO CHANGES TO RATE THIS SHIFT.
HAD NO C/O PAIN OR DISCOMFORT DURING THE NIGHT, SLEPT ON/OFF T/O THE NIGHT, NO
APPARENT DISTRESS NOTED. VSS. CALL LIGHT WITHIN REACH, SIDE RAILS UPX2, AND
BED ALARM ON FOR SAFETY. WILL CONT TO MONITOR.

## 2021-03-23 NOTE — NUR
SUMMARY
 
PT RESTING QUIETLY IN BED, WAKES EASILY, STILL HAVING APHASIA, COOPERATIVE
WITH CARE, WORKED WITH PT/OT/ST, UP WALKING IN THE HALLS, PT DID WANDER AWAY
AT ONE POINT UNSUPERVISED, FOUND AND BROUGHT BACK BY THE CHARGE RN, INFORMED
PT TO NOT LEAVE THE FLOOR DUE TO IV INFUSION, VSS, WILL CONT TO MONITOR

## 2021-03-24 LAB
ANION GAP SERPL CALCULATED.4IONS-SCNC: 6 MMOL/L (ref 6–16)
BUN SERPL-MCNC: 28 MG/DL (ref 8–24)
CALCIUM SERPL-MCNC: 7.9 MG/DL (ref 8.5–10.1)
CHLORIDE SERPL-SCNC: 111 MMOL/L (ref 98–108)
CO2 SERPL-SCNC: 26 MMOL/L (ref 21–32)
CREAT SERPL-MCNC: 1.41 MG/DL (ref 0.6–1.2)
GLUCOSE SERPL-MCNC: 68 MG/DL (ref 70–99)
POTASSIUM SERPL-SCNC: 4 MMOL/L (ref 3.5–5.5)
PROTHROMBIN TIME: 14.1 SEC (ref 9.7–11.5)
SODIUM SERPL-SCNC: 143 MMOL/L (ref 136–145)

## 2021-03-24 NOTE — NUR
SUMMARY
NO ACUTE CHANGES NOTED. PT HAS SLEPT T/O SHIFT. PT DENIES SOB OR CX PAIN. CALL
LIGHT IN REACH.

## 2021-03-24 NOTE — NUR
PATIENT IS ALERT TO SELF AND FOLLOWING DIRECTIONS. WHEN ASKED WHERE HE IS THIS
MORNING HE STATED "AT AN [facility]", HE COULD NOT GIVE HIS BIRTH DATE OR THE
DATE AND TIME. PATIENT PARTICIPATED IN PT,OT AND SPEECH THEREAPY. HE IS
INDEPENDENT IN HIS ROOM. HEPARIN DRIP IS RUNNING. PLAN IS TO DC HOME WITH HOME
HEALTH WHEN INR IS THERAPUETIC. NO NEW COMPLAINTS TODAY. WILL CONTINUE TO
MONITOR

## 2021-03-25 LAB
ALBUMIN SERPL BCP-MCNC: 3 G/DL (ref 3.4–5)
ANION GAP SERPL CALCULATED.4IONS-SCNC: 6 MMOL/L (ref 6–16)
BUN SERPL-MCNC: 30 MG/DL (ref 8–24)
CALCIUM SERPL-MCNC: 8.4 MG/DL (ref 8.5–10.1)
CHLORIDE SERPL-SCNC: 109 MMOL/L (ref 98–108)
CHOLEST SERPL-MCNC: 65 MG/DL (ref 50–200)
CHOLEST/HDLC SERPL: 2 {RATIO}
CO2 SERPL-SCNC: 25 MMOL/L (ref 21–32)
CREAT SERPL-MCNC: 1.42 MG/DL (ref 0.6–1.2)
DEPRECATED RDW RBC AUTO: 47.3 FL (ref 35.1–46.3)
ERYTHROCYTE [DISTWIDTH] IN BLOOD BY AUTOMATED COUNT: 13.5 % (ref 11.7–14.2)
GLUCOSE SERPL-MCNC: 85 MG/DL (ref 70–99)
HCT VFR BLD AUTO: 47 % (ref 37–53)
HDLC SERPL-MCNC: 32 MG/DL (ref 39–?)
HGB BLD-MCNC: 15.3 G/DL (ref 13.5–17.5)
LDLC SERPL CALC-MCNC: 26 MG/DL (ref 0–110)
LDLC/HDLC SERPL: 0.8 {RATIO}
MCHC RBC AUTO-ENTMCNC: 32.6 G/DL (ref 31.5–36.5)
MCV RBC AUTO: 95 FL (ref 80–100)
NRBC # BLD AUTO: 0 K/MM3 (ref 0–0.02)
NRBC BLD AUTO-RTO: 0 /100 WBC (ref 0–0.2)
PHOSPHATE SERPL-MCNC: 3.6 MG/DL (ref 2.5–4.9)
PLATELET # BLD AUTO: 177 K/MM3 (ref 150–400)
POTASSIUM SERPL-SCNC: 4.2 MMOL/L (ref 3.5–5.5)
PROTHROMBIN TIME: 15.6 SEC (ref 9.7–11.5)
SODIUM SERPL-SCNC: 140 MMOL/L (ref 136–145)
TRIGL SERPL-MCNC: 36 MG/DL (ref 30–160)
VLDLC SERPL CALC-MCNC: 7 MG/DL (ref 6–32)

## 2021-03-25 NOTE — NUR
PATIENT IS ALERT. HE IS ORIENTED TO HIS .HE FOLLOWS DIRECTIONS. THE PATIENT
IS RELUCTANT TO WORK WITH THERAPIES. HEPARIN DRIP IS INFUSING. BOWEL CARE
ORDERS PLACED TODAY. PATIENT C/O ABDOMINAL PAIN WHILE WALKING WITH SPEECH
THERAPY, DR. LINK NOTIFIED. WILL CONTINUE TO MONITOR.

## 2021-03-25 NOTE — NUR
SUMMARY
NO NEW ISSUES NOTED. PT REMAINS CONFUSED BUT FOLLOWS DIRECTIONS. PT CONTINUES
TO BE INDEPENDANT IN ROOM. PT HEPARIN IS RUNNING. PT CURRENTLY SLEEPING IN NO
DISTRESS. CALL LIGHT IN REACH.

## 2021-03-26 LAB
HCT VFR BLD AUTO: 46.7 % (ref 37–53)
HGB BLD-MCNC: 15.2 G/DL (ref 13.5–17.5)
PROTHROMBIN TIME: 18.9 SEC (ref 9.7–11.5)

## 2021-03-26 NOTE — NUR
NIGHT SHIFT SUMMARY
 PT IS ALERT. HAS EXPRESSIVE APHASIA, ABLE TO ANSWER YES OR NO QUESTIONS. FOR
EXAMPLED WHEN I ASKED IF PT PREFERED TO HAVE CHOCOLATE OR VANILLA PUDDING, PT
RESPONDED WITH "YES". PT IS COOPERATIVE. DENIES PAIN, SOB, NAUSEA. CONTINUES
TO BE ON HEPARIN DRIP. INDEPENDENT IN ROOM. NO FACIAL DROOPING OR WEAKNESS
IN STRENGTH. SLEPT ON AND OFF TONIGHT. CALL LIGHT WITHIN REACH.

## 2021-03-26 NOTE — NUR
SHIFT SUMMARY.
ALERT, DIFFICULT TO ASSESS ORIENTATION AS PT AS EXPRESSIVE APHASIA, PT APPEAR
TO COMPREHEND WHAT IS BEING SAID TO HIM AS HE ANSWERS APPROPRIATLY WITH Y/N
ANSWERS. PT DENIES PAIN, SOB, N/V. CONTINUES WITH HEPARIN DRIP. SIGNIFICANT
OTHER IN TO VISIT THIS EVENING. NO OTHER CHANGES OR CONCERNS.

## 2021-03-27 LAB
DEPRECATED RDW RBC AUTO: 48 FL (ref 35.1–46.3)
ERYTHROCYTE [DISTWIDTH] IN BLOOD BY AUTOMATED COUNT: 13.4 % (ref 11.7–14.2)
HCT VFR BLD AUTO: 46.1 % (ref 37–53)
HGB BLD-MCNC: 15 G/DL (ref 13.5–17.5)
MCHC RBC AUTO-ENTMCNC: 32.5 G/DL (ref 31.5–36.5)
MCV RBC AUTO: 96 FL (ref 80–100)
NRBC # BLD AUTO: 0 K/MM3 (ref 0–0.02)
NRBC BLD AUTO-RTO: 0 /100 WBC (ref 0–0.2)
PLATELET # BLD AUTO: 167 K/MM3 (ref 150–400)
PROTHROMBIN TIME: 30.2 SEC (ref 9.7–11.5)

## 2021-03-27 NOTE — NUR
PATIENT IS ALERT. HE IS ABLE TO FOLLOW SIMPLE DIRECTIONS. HIS AFFECT IS FLAT.
THE PATIENT HAD A BM TODAY. NO NEW COMPLAINTS. THE HEPARIN DRIP WAS
DISCONTINUED. HIS INR WAS 3.0 THIS MORNING. PATIENT IS INDEPENDENT IN HIS
ROOM. WILL CONTINUE TO MONITOR

## 2021-03-27 NOTE — NUR
NIGHT SHIFT SUMMARY
A/OX3, FLAT AFFECT WITH EXPRESSIVE APASHIA NOTED. ABLE TO ANSWER YES/NO
QUESTIONS. INDEPENDENT IN ROOM. HEPARIN DRIP MANAGED PER PHARMACY. DENIES PAIN
OR SOB. VSS, NO ACUTE CHANGES AT THIS TIME. BED IN LOWEST POSITION WITH CALL
LIGHT IN REACH. WILL CONTINUE TO MONITOR AND REPORT TO ONCOMING RN.

## 2021-03-28 LAB — PROTHROMBIN TIME: 35.3 SEC (ref 9.7–11.5)

## 2021-03-28 NOTE — NUR
NIGHT SHIFT SUMMARY
A/OX3, FLAT AFFECT. COOPERATIVE WITH CARE. PT HYPOTENSIVE, BP MEDS HELD.
DENIES PAIN OR SOB. NO ACUTE CHANGES AT THIS TIME. BED IN LOWEST POSITION WITH
CALL LIGHT IN REACH. WILL CONTINUE TO MONITOR AND REPORT TO ONCOMING RN.

## 2021-03-28 NOTE — NUR
PATIENT DISCHARGED AT 1745. THE PATIENT WAS THREATENING TO LEAVE WITH HIS
GIRLFRIEND AGAINST MEDICAL ADVICE. THIS RN TOLD THE PATIENT THAT SHE WOULD
CALL DR. SPEARS TO HAVE HIM COME UP TO SPEAK WITH HIM. DR. SPEARS EXPLAINED TO
THE PATIENT THE RISKS OF LEAVING AND THE PATIENT WAS ADAMANT THAT HE WAS GOING
TO LEAVE TODAY REGARDLESS OF THE RISKS. DR. SPEARS DISCHARGED THE PATIENT. THE
PATIENT AND HIS GIRLFRIEND WENT OVER THE DISCHARGE PAPERWORK WITH THE RN. THE
PATIENT WAS PUSHED IN A WHEELCHAIR OFF MEDICAL FLOOR BY HIS GIRLFRIEND, SAEID.

## 2021-11-13 ENCOUNTER — HOSPITAL ENCOUNTER (EMERGENCY)
Dept: HOSPITAL 95 - ER | Age: 61
Discharge: HOME | End: 2021-11-13
Payer: COMMERCIAL

## 2021-11-13 VITALS — WEIGHT: 209.99 LBS | HEIGHT: 73 IN | BODY MASS INDEX: 27.83 KG/M2

## 2021-11-13 DIAGNOSIS — Z79.899: ICD-10-CM

## 2021-11-13 DIAGNOSIS — G47.33: ICD-10-CM

## 2021-11-13 DIAGNOSIS — E11.65: ICD-10-CM

## 2021-11-13 DIAGNOSIS — I13.0: ICD-10-CM

## 2021-11-13 DIAGNOSIS — F17.210: ICD-10-CM

## 2021-11-13 DIAGNOSIS — E11.22: ICD-10-CM

## 2021-11-13 DIAGNOSIS — N18.30: ICD-10-CM

## 2021-11-13 DIAGNOSIS — Z79.01: ICD-10-CM

## 2021-11-13 DIAGNOSIS — U07.1: Primary | ICD-10-CM

## 2021-11-13 DIAGNOSIS — E78.5: ICD-10-CM

## 2021-11-13 DIAGNOSIS — I50.22: ICD-10-CM

## 2021-11-13 DIAGNOSIS — Z79.4: ICD-10-CM

## 2021-11-13 LAB
ANION GAP SERPL CALCULATED.4IONS-SCNC: 5 MMOL/L (ref 6–16)
BASOPHILS # BLD AUTO: 0.03 K/MM3 (ref 0–0.23)
BASOPHILS NFR BLD AUTO: 0 % (ref 0–2)
BUN SERPL-MCNC: 20 MG/DL (ref 8–24)
CALCIUM SERPL-MCNC: 9 MG/DL (ref 8.5–10.1)
CHLORIDE SERPL-SCNC: 99 MMOL/L (ref 98–108)
CO2 SERPL-SCNC: 30 MMOL/L (ref 21–32)
CREAT SERPL-MCNC: 1.27 MG/DL (ref 0.6–1.2)
DEPRECATED RDW RBC AUTO: 46.5 FL (ref 35.1–46.3)
EOSINOPHIL # BLD AUTO: 0.22 K/MM3 (ref 0–0.68)
EOSINOPHIL NFR BLD AUTO: 2 % (ref 0–6)
ERYTHROCYTE [DISTWIDTH] IN BLOOD BY AUTOMATED COUNT: 13.7 % (ref 11.7–14.2)
GLUCOSE SERPL-MCNC: 550 MG/DL (ref 70–99)
HCT VFR BLD AUTO: 41.3 % (ref 37–53)
HGB BLD-MCNC: 13.7 G/DL (ref 13.5–17.5)
IMM GRANULOCYTES # BLD AUTO: 0.02 K/MM3 (ref 0–0.1)
IMM GRANULOCYTES NFR BLD AUTO: 0 % (ref 0–1)
LYMPHOCYTES # BLD AUTO: 2.72 K/MM3 (ref 0.84–5.2)
LYMPHOCYTES NFR BLD AUTO: 27 % (ref 21–46)
MCHC RBC AUTO-ENTMCNC: 33.2 G/DL (ref 31.5–36.5)
MCV RBC AUTO: 93 FL (ref 80–100)
MONOCYTES # BLD AUTO: 0.61 K/MM3 (ref 0.16–1.47)
MONOCYTES NFR BLD AUTO: 6 % (ref 4–13)
NEUTROPHILS # BLD AUTO: 6.32 K/MM3 (ref 1.96–9.15)
NEUTROPHILS NFR BLD AUTO: 64 % (ref 41–73)
NRBC # BLD AUTO: 0 K/MM3 (ref 0–0.02)
NRBC BLD AUTO-RTO: 0 /100 WBC (ref 0–0.2)
PLATELET # BLD AUTO: 180 K/MM3 (ref 150–400)
POTASSIUM SERPL-SCNC: 4.4 MMOL/L (ref 3.5–5.5)
PROTHROMBIN TIME: 15.3 SEC (ref 9.7–11.5)
SODIUM SERPL-SCNC: 134 MMOL/L (ref 136–145)

## 2021-11-30 ENCOUNTER — HOSPITAL ENCOUNTER (INPATIENT)
Dept: HOSPITAL 95 - ER | Age: 61
LOS: 8 days | DRG: 368 | End: 2021-12-08
Attending: HOSPITALIST | Admitting: HOSPITALIST
Payer: COMMERCIAL

## 2021-11-30 VITALS — HEIGHT: 73 IN | WEIGHT: 188.72 LBS | BODY MASS INDEX: 25.01 KG/M2

## 2021-11-30 DIAGNOSIS — G47.33: ICD-10-CM

## 2021-11-30 DIAGNOSIS — E87.2: ICD-10-CM

## 2021-11-30 DIAGNOSIS — I48.19: ICD-10-CM

## 2021-11-30 DIAGNOSIS — T42.4X5A: ICD-10-CM

## 2021-11-30 DIAGNOSIS — I42.8: ICD-10-CM

## 2021-11-30 DIAGNOSIS — F17.210: ICD-10-CM

## 2021-11-30 DIAGNOSIS — K76.6: ICD-10-CM

## 2021-11-30 DIAGNOSIS — F10.20: ICD-10-CM

## 2021-11-30 DIAGNOSIS — K31.89: ICD-10-CM

## 2021-11-30 DIAGNOSIS — K22.70: ICD-10-CM

## 2021-11-30 DIAGNOSIS — A41.89: ICD-10-CM

## 2021-11-30 DIAGNOSIS — E10.22: ICD-10-CM

## 2021-11-30 DIAGNOSIS — I82.401: ICD-10-CM

## 2021-11-30 DIAGNOSIS — E87.1: ICD-10-CM

## 2021-11-30 DIAGNOSIS — I49.3: ICD-10-CM

## 2021-11-30 DIAGNOSIS — F15.10: ICD-10-CM

## 2021-11-30 DIAGNOSIS — G92.8: ICD-10-CM

## 2021-11-30 DIAGNOSIS — K20.91: Primary | ICD-10-CM

## 2021-11-30 DIAGNOSIS — E78.00: ICD-10-CM

## 2021-11-30 DIAGNOSIS — N18.30: ICD-10-CM

## 2021-11-30 DIAGNOSIS — J12.82: ICD-10-CM

## 2021-11-30 DIAGNOSIS — J96.01: ICD-10-CM

## 2021-11-30 DIAGNOSIS — F32.A: ICD-10-CM

## 2021-11-30 DIAGNOSIS — I51.3: ICD-10-CM

## 2021-11-30 DIAGNOSIS — Z86.73: ICD-10-CM

## 2021-11-30 DIAGNOSIS — I25.2: ICD-10-CM

## 2021-11-30 DIAGNOSIS — Z86.19: ICD-10-CM

## 2021-11-30 DIAGNOSIS — R57.8: ICD-10-CM

## 2021-11-30 DIAGNOSIS — Z79.899: ICD-10-CM

## 2021-11-30 DIAGNOSIS — R57.1: ICD-10-CM

## 2021-11-30 DIAGNOSIS — I25.10: ICD-10-CM

## 2021-11-30 DIAGNOSIS — I13.0: ICD-10-CM

## 2021-11-30 DIAGNOSIS — U07.1: ICD-10-CM

## 2021-11-30 DIAGNOSIS — I50.22: ICD-10-CM

## 2021-11-30 DIAGNOSIS — Z79.01: ICD-10-CM

## 2021-11-30 DIAGNOSIS — K70.30: ICD-10-CM

## 2021-11-30 DIAGNOSIS — Z51.5: ICD-10-CM

## 2021-11-30 DIAGNOSIS — Z66: ICD-10-CM

## 2021-11-30 DIAGNOSIS — Z20.822: ICD-10-CM

## 2021-11-30 DIAGNOSIS — Z78.1: ICD-10-CM

## 2021-11-30 DIAGNOSIS — N17.9: ICD-10-CM

## 2021-11-30 LAB
ALBUMIN SERPL BCP-MCNC: 2.1 G/DL (ref 3.4–5)
ALBUMIN/GLOB SERPL: 0.4 {RATIO} (ref 0.8–1.8)
ALT SERPL W P-5'-P-CCNC: 367 U/L (ref 12–78)
ANION GAP SERPL CALCULATED.4IONS-SCNC: 12 MMOL/L (ref 6–16)
AST SERPL W P-5'-P-CCNC: 103 U/L (ref 12–37)
BASOPHILS # BLD AUTO: 0.05 K/MM3 (ref 0–0.23)
BASOPHILS NFR BLD AUTO: 1 % (ref 0–2)
BILIRUB SERPL-MCNC: 3.3 MG/DL (ref 0.1–1)
BUN SERPL-MCNC: 46 MG/DL (ref 8–24)
CALCIUM SERPL-MCNC: 8.3 MG/DL (ref 8.5–10.1)
CHLORIDE SERPL-SCNC: 95 MMOL/L (ref 98–108)
CO2 SERPL-SCNC: 26 MMOL/L (ref 21–32)
CREAT SERPL-MCNC: 1.82 MG/DL (ref 0.6–1.2)
DEPRECATED RDW RBC AUTO: 49.4 FL (ref 35.1–46.3)
EOSINOPHIL # BLD AUTO: 0.07 K/MM3 (ref 0–0.68)
EOSINOPHIL NFR BLD AUTO: 1 % (ref 0–6)
ERYTHROCYTE [DISTWIDTH] IN BLOOD BY AUTOMATED COUNT: 15.1 % (ref 11.7–14.2)
ETHANOL SERPL-MCNC: <3 MG/DL
FLUAV RNA SPEC QL NAA+PROBE: NEGATIVE
FLUBV RNA SPEC QL NAA+PROBE: NEGATIVE
GLOBULIN SER CALC-MCNC: 5.1 G/DL (ref 2.2–4)
GLUCOSE SERPL-MCNC: 367 MG/DL (ref 70–99)
HCT VFR BLD AUTO: 42.5 % (ref 37–53)
HGB BLD-MCNC: 14.1 G/DL (ref 13.5–17.5)
IMM GRANULOCYTES # BLD AUTO: 0.02 K/MM3 (ref 0–0.1)
IMM GRANULOCYTES NFR BLD AUTO: 0 % (ref 0–1)
LYMPHOCYTES # BLD AUTO: 2.71 K/MM3 (ref 0.84–5.2)
LYMPHOCYTES NFR BLD AUTO: 27 % (ref 21–46)
MCHC RBC AUTO-ENTMCNC: 33.2 G/DL (ref 31.5–36.5)
MCV RBC AUTO: 89 FL (ref 80–100)
MONOCYTES # BLD AUTO: 1.06 K/MM3 (ref 0.16–1.47)
MONOCYTES NFR BLD AUTO: 11 % (ref 4–13)
NEUTROPHILS # BLD AUTO: 6.16 K/MM3 (ref 1.96–9.15)
NEUTROPHILS NFR BLD AUTO: 61 % (ref 41–73)
NRBC # BLD AUTO: 0 K/MM3 (ref 0–0.02)
NRBC BLD AUTO-RTO: 0 /100 WBC (ref 0–0.2)
PLATELET # BLD AUTO: 162 K/MM3 (ref 150–400)
POTASSIUM SERPL-SCNC: 3.5 MMOL/L (ref 3.5–5.5)
PROT SERPL-MCNC: 7.2 G/DL (ref 6.4–8.2)
RSV RNA SPEC QL NAA+PROBE: NEGATIVE
SARS-COV-2 RNA RESP QL NAA+PROBE: POSITIVE
SODIUM SERPL-SCNC: 133 MMOL/L (ref 136–145)

## 2021-11-30 PROCEDURE — P9059 PLASMA, FRZ BETWEEN 8-24HOUR: HCPCS

## 2021-11-30 PROCEDURE — P9046 ALBUMIN (HUMAN), 25%, 20 ML: HCPCS

## 2021-11-30 PROCEDURE — G0480 DRUG TEST DEF 1-7 CLASSES: HCPCS

## 2021-11-30 PROCEDURE — 30233K1 TRANSFUSION OF NONAUTOLOGOUS FROZEN PLASMA INTO PERIPHERAL VEIN, PERCUTANEOUS APPROACH: ICD-10-PCS | Performed by: STUDENT IN AN ORGANIZED HEALTH CARE EDUCATION/TRAINING PROGRAM

## 2021-11-30 PROCEDURE — C8929 TTE W OR WO FOL WCON,DOPPLER: HCPCS

## 2021-11-30 PROCEDURE — C9113 INJ PANTOPRAZOLE SODIUM, VIA: HCPCS

## 2021-11-30 PROCEDURE — A9270 NON-COVERED ITEM OR SERVICE: HCPCS

## 2021-11-30 PROCEDURE — 30233L1 TRANSFUSION OF NONAUTOLOGOUS FRESH PLASMA INTO PERIPHERAL VEIN, PERCUTANEOUS APPROACH: ICD-10-PCS | Performed by: STUDENT IN AN ORGANIZED HEALTH CARE EDUCATION/TRAINING PROGRAM

## 2021-11-30 PROCEDURE — C9399 UNCLASSIFIED DRUGS OR BIOLOG: HCPCS

## 2021-12-01 LAB
AMPHETAMINES UR SCN-MCNC: DETECTED NG/ML
AMPHETAMINES UR-MCNC: DETECTED UG/L
ANION GAP SERPL CALCULATED.4IONS-SCNC: 25 MMOL/L (ref 6–16)
BUN SERPL-MCNC: 49 MG/DL (ref 8–24)
CALCIUM SERPL-MCNC: 8.6 MG/DL (ref 8.5–10.1)
CHLORIDE SERPL-SCNC: 95 MMOL/L (ref 98–108)
CO2 SERPL-SCNC: 16 MMOL/L (ref 21–32)
CREAT SERPL-MCNC: 2.1 MG/DL (ref 0.6–1.2)
DEPRECATED RDW RBC AUTO: 53.7 FL (ref 35.1–46.3)
ERYTHROCYTE [DISTWIDTH] IN BLOOD BY AUTOMATED COUNT: 15.7 % (ref 11.7–14.2)
GLUCOSE SERPL-MCNC: 235 MG/DL (ref 70–99)
GLUCOSE UR-MCNC: (no result) MG/DL
GLUCOSE UR-MCNC: (no result) MG/DL
HCT VFR BLD AUTO: 37.9 % (ref 37–53)
HCT VFR BLD AUTO: 42.3 % (ref 37–53)
HGB BLD-MCNC: 12.2 G/DL (ref 13.5–17.5)
HGB BLD-MCNC: 13.4 G/DL (ref 13.5–17.5)
MAGNESIUM SERPL-MCNC: 2.6 MG/DL (ref 1.6–2.4)
MCHC RBC AUTO-ENTMCNC: 31.7 G/DL (ref 31.5–36.5)
MCV RBC AUTO: 94 FL (ref 80–100)
NRBC # BLD AUTO: 0 K/MM3 (ref 0–0.02)
NRBC BLD AUTO-RTO: 0 /100 WBC (ref 0–0.2)
PH BLDA: 7.3 [PH] (ref 7.35–7.45)
PHOSPHATE SERPL-MCNC: 6.2 MG/DL (ref 2.5–4.9)
PLATELET # BLD AUTO: 158 K/MM3 (ref 150–400)
POTASSIUM SERPL-SCNC: 3.8 MMOL/L (ref 3.5–5.5)
PROT UR STRIP-MCNC: (no result) MG/DL
PROT UR STRIP-MCNC: (no result) MG/DL
PROTHROMBIN TIME: 23.8 SEC (ref 9.7–11.5)
RBC #/AREA URNS HPF: (no result) /HPF (ref 0–2)
RBC #/AREA URNS HPF: (no result) /HPF (ref 0–2)
SODIUM SERPL-SCNC: 136 MMOL/L (ref 136–145)
SP GR SPEC: 1.01 (ref 1–1.02)
SP GR SPEC: 1.02 (ref 1–1.02)
TRIGL SERPL-MCNC: 113 MG/DL (ref 30–160)
UROBILINOGEN UR STRIP-MCNC: (no result) MG/DL
UROBILINOGEN UR STRIP-MCNC: (no result) MG/DL
WBC #/AREA URNS HPF: (no result) /HPF (ref 0–5)
WBC #/AREA URNS HPF: (no result) /HPF (ref 0–5)

## 2021-12-01 PROCEDURE — 0DJ08ZZ INSPECTION OF UPPER INTESTINAL TRACT, VIA NATURAL OR ARTIFICIAL OPENING ENDOSCOPIC: ICD-10-PCS | Performed by: STUDENT IN AN ORGANIZED HEALTH CARE EDUCATION/TRAINING PROGRAM

## 2021-12-01 PROCEDURE — 3E033XZ INTRODUCTION OF VASOPRESSOR INTO PERIPHERAL VEIN, PERCUTANEOUS APPROACH: ICD-10-PCS | Performed by: HOSPITALIST

## 2021-12-01 NOTE — NUR
SUMMARY
PATIENT RESTING QUIETLY, AWAKENS EASILY TO SLIGHT STIMULI. CONFUSION
CONTINUES. PATIENT VERY SPONTANEOUS AND IRRITABLE. CONTINUE TO HAVE DIFFICULTY
OBTAINING ACCURATE BIOX READING. 4 UNITS FFP COMPLETE AND  CC/HR
STARTED. LUNG SOUNDS COARSE T/O AND DECREASED BASES. HARSH COUGH OFF AND ON
ONCE HAD BRIGHT RED BLOOD SPOTS ON LINEN AFTER COUGH. ORAL CARE COMPLETED
TWICE AND PATIENT CONTINUES TO GET BLACK DRIED BLOOD REBUILD UP ON HIS LIPS
AND TONGUE. PATIENT DENIES NAUSEA AND DOESN'T APPEAR TO BE HAVING EMESIS.
DIFFICULT TO SEE INSIDE OF MOUTH TO SEE IF THERE ARE WOUNDS INSIDE. AFIB
CONTINUES CARDIZEM STARTED AT 15 MG AND NOW IS TITRATED TO 5 MG/HR WITH RATE
116-130. HYPOTENSION CONTINUES.

## 2021-12-01 NOTE — NUR
SHIFT SUMMARY
PT REMAINS ON 9L OXYMIZER. PT RECEIVING OCTREOTIDE, PROTONIX AND NS.
NEURO: PT WAKES TO VERBAL STIMULI. ANSWERS QUESTIONS BUT IS AN UNRELIABLE
HISTORIAN. ABLE TO STATE NAME BUT CONSISTENTLY UNSURE OF WHERE HE IS, BELIEVES
IT IS 2001 AND AGNES JAVIER IS PRESIDENT. SPOUSE STS HE NORMALLY THINKS JOSÉ MIGUEL
IS PRESIDENT.
RESP: PT HAS BEEN ON 9L VIA OXYMIZER THROUGHOUT SHIFT. LUNGS ARE COARSE
THROUGHOUT AND DIMINISHED IN BASES.
CARDIAC: PT HAS BEEN IN AFIB THROUGHOUT SHIFT, HR 110S-120S. FAINT RADIAL
PULSES. R POST TIBIAL PULSE AUDIBLE WITH DOPPLER, L PEDAL PULSE AUDIBLE WITH
DOPPLER.
GI: PT REMAINS NPO. PT'S MOUTH CONTINUES TO HAVE DRIED BLOOD, PT DENIES N/V.
ORAL CARE DONE MULTIPLE TIMES THIS SHIFT.
PT USED BEDPAN APPROPRIATELY. 1 BM THIS SHIFT.
: TEMP SUSANA PLACED THIS SHIFT. URINE IS YELLOW, ADEQUATE OUTPUT.
SKIN: R UPPER ARM AND BLE REMAIN MOTTLED.
PLAN FOR EGD AND ABDOMINAL US THIS EVENING. WILL REPORT TO ONCOMING RN.

## 2021-12-01 NOTE — NUR
This RN was performing oral care as pt tolerated. At end of oral care, pt
picked debris off tongue. When provided with recepticle, it was noted that
debris was hard and resembled a portion of a tooth. Pt was not bothered by
event and when asked if it was a tooth, he said "is it?". Pt's girlfriend
states "He has bad teeth, you know". Pt provided with mouthwash. No blood
noted.

## 2021-12-01 NOTE — NUR
AT 0155 PATIENT ARRIVED TO ICU 5 VIA GURNEY FROM ED. PATIENT SLEEPING AWAKENS
TO SLIGHT STIMULI, CONFUSED CONVERSATION, BUT FOLLOWING SIMPLE DIRECTIONS.
HANDS AND FEET DUSKY WITH MOTTLING UP PAST KNEES. RIGHT LEG LARGER THAN LEFT.
EARS ALSO DISCOLORED.  DIFFICULTY OBTAINING BIOX READING DUE TO EARS ALSO WITH
POOR PERFUSION.  SANDOSTATIN AND PROTONIX DRIP INFUSING. BLACK OLD BLOOD T/O
MOUTH AND LIPS.  ORAL CARE DONE. 'S 'S AFIB. FFP STARTED. ORDER
OBTAINED FOR CARDIZEM DRIP. AND TO START IV FLUIDS AFTER FFP X4 UNITS GIVEN.
PATIENT HAS SEVER SLEEP APNEA. WHEN AWAKE VERY RESTLESS. VERBALIZED HAD METH
"THE OTHER DAY"

## 2021-12-01 NOTE — NUR
12/01/21 1834 Africa Alonzo
History, Chart, Medications and Allergies reviewed before start of
procedure.Patient confirms NPO status and agrees with scheduled
surgery.MONITOR INTACT WITH CONTINUOUS PULSE OXIMETRY AND INTERMITTENT
BP.O2 VIA N/C INTACT THROUGHOUT SEDATION/PROCEDURE. 3-LEAD EKG
REVIEWED WITH PHYSICIAN PRIOR TO START OF PROCEDURE.See Anesthesia
record

## 2021-12-01 NOTE — NUR
EGD
DR ALVARADO, DR KRUEGER, AND 2 DAY SURGERY RNS AT BEDSIDE TO PERFORM
ENDOSCOPY. DURING PROCEDURE, SUPPLEMENTAL O2 INCREASED FROM 9L OXYMIZER TO 15L
NON-REBREATHER. SBP DECREASED TO 80S, MAP MAINTAINED >65. OROPHARYNGEAL AIRWAY
PLACED BY DR KRUEGER.
APPROX 15MIN POST-PROCEDURE, PT BEGAN WAKING UP. PT OPENS EYES, MAKES EYE
CONTACT, ANSWERS QUESTIONS BY NODDING HEAD. OROPHARYNGEAL AIRWAY REMOVED, PT
PLACED BACK ON 9L OXYMIZER.
ORDERS RECEIVED FROM DR ALVARADO TO D/C OCTREOTIDE AND CHANGE PROTONIX TO
BID. SEE DAY SURGERY NOTES.

## 2021-12-01 NOTE — NUR
UPDATE
 
CT chest cancelled. Patient given PO medications after oral care performed,
tolerated well. Pt drank some ice water, no signs of aspiration. Pt requests
something to eat, will discuss with provider. Pt has been off levophed for
several hours. Unable to located pedal pulses with doppler but bilateral
posttibial pulses identified.

## 2021-12-01 NOTE — NUR
ASSUMED PT CARE AT 1915 FROM [facility]
PT SLEEPING IN BED UPON ASSUMPTION OF CARE; HOWEVER, UPON ENTERING ROOM PT
AWAKENS TO VERBAL STIMULI. ALERT AND ORIENTED TO SELF AND SURROUNDINGS;
CONFUSED TO PLACE AND YEAR. PT NOTED TO BE AFIB WITH RVR; RATE 120-130'S; BP'S
STABLE AT THIS TIME. PROTONIX AND SANDOSTATIN GTTS DISCONTINUED; HOWEVER, NS
REMAINS INFUSING AT 60MLS/HR VIA 18G TO RIGHT FOREARM. PT NOTED TO HAVE
CRACKLES/RHONCHI T/O ALL LOBES; DRY COUGH. DENIES N/V. NO EVIDENCE OF ACTIVE
BLEED AT THIS TIME. THERE IS A TOOTH THAT IS IN A SAMPLE CUP IN ROOM IN WHICH
WAS A POSSIBLE CAUSE OF BLEEDING FROM MOUTH TODAY. PT IS ON 10L VIA OXYMIZER
WITH OXYGEN SATURATIONS BEING OBTAINED VIA FOREHEAD WITH O2 SATS >95%. PT
BECOMES SOB WITH ANY EXERTION AND WHEN LYING FLAT. RLE +2 PITTING EDEMA WITH
LLE +1. PULSES OBTAINED VIA DOPPLER TO BLE'S. RADIAL PULSES PALPABLE. CALL
LIGHT WITHIN REACH; HOWEVER, PT UNABLE TO DEMONSTRATE USE. WILL CONTINUE TO
MONITOR; BED ALARM IN PLACE. PT SITTING UP IN BED EATING YOGURT AND PUDDING.

## 2021-12-01 NOTE — NUR
ASSUMPTION OF CARE
PT REMAINS ON OXYMIZER AT 9L/MIN. PT CURRENTLY RECEIVING CARDIZEM 5MCG/HR,
OCTREOTIDE, PROTONIX AND NS. PT OPENS EYES SPONTANEOUSLY BUT MOSTLY KEEPS
THEM CLOSED, OPENS WITH VERBAL STIMULI. PT ANSWERS QUESTIONS BUT IS
INCONSISTENT AND CONFUSED AT TIMES. UNABLE TO OBTAIN BP, ATTEMPTED DOPPLER
WITHOUT SUCCESS. PT REMAINS AWAKE DURING THIS TIME. LEVOPHED STARTED AT
5MCG/MIN. R UPPER ARM, AND BILAT LEGS ARE MOTTLED UP TO THIGHS.
DR WILLARD AT BEDSIDE FOR EVAL. ORDERS RECEIVED INCLUDING ABG, RT NOTIFIED.
DISCUSSED POSSIBLE NEED FOR CENTRAL LINE, HYPOTENSION, AND PT'S OVERALL
CONDITION.
DR SPEARS AT BEDSIDE FOR EVAL. ORDER RECEIVED FOR CHEST CT. PLAN FOR CONTINUED
INTENSIVIST INTERVENTION, CONSULT FOR GI, AND ECHO.
SEE SHIFT ASSESSMENT.

## 2021-12-02 LAB
ALBUMIN SERPL BCP-MCNC: 2.8 G/DL (ref 3.4–5)
ALBUMIN/GLOB SERPL: 0.7 {RATIO} (ref 0.8–1.8)
ALT SERPL W P-5'-P-CCNC: 237 U/L (ref 12–78)
ANION GAP SERPL CALCULATED.4IONS-SCNC: 13 MMOL/L (ref 6–16)
AST SERPL W P-5'-P-CCNC: 151 U/L (ref 12–37)
BASOPHILS # BLD AUTO: 0.01 K/MM3 (ref 0–0.23)
BASOPHILS NFR BLD AUTO: 0 % (ref 0–2)
BILIRUB SERPL-MCNC: 6.5 MG/DL (ref 0.1–1)
BUN SERPL-MCNC: 62 MG/DL (ref 8–24)
CALCIUM SERPL-MCNC: 8.5 MG/DL (ref 8.5–10.1)
CHLORIDE SERPL-SCNC: 98 MMOL/L (ref 98–108)
CO2 SERPL-SCNC: 24 MMOL/L (ref 21–32)
CREAT SERPL-MCNC: 2.02 MG/DL (ref 0.6–1.2)
DEPRECATED RDW RBC AUTO: 51.1 FL (ref 35.1–46.3)
EOSINOPHIL # BLD AUTO: 0 K/MM3 (ref 0–0.68)
EOSINOPHIL NFR BLD AUTO: 0 % (ref 0–6)
ERYTHROCYTE [DISTWIDTH] IN BLOOD BY AUTOMATED COUNT: 15.8 % (ref 11.7–14.2)
GLOBULIN SER CALC-MCNC: 4.1 G/DL (ref 2.2–4)
GLUCOSE SERPL-MCNC: 253 MG/DL (ref 70–99)
HCT VFR BLD AUTO: 40.2 % (ref 37–53)
HEP C VIRUS AB: >11 (ref 0–0.9)
HGB BLD-MCNC: 13.2 G/DL (ref 13.5–17.5)
IMM GRANULOCYTES # BLD AUTO: 0.06 K/MM3 (ref 0–0.1)
IMM GRANULOCYTES NFR BLD AUTO: 1 % (ref 0–1)
LYMPHOCYTES # BLD AUTO: 1.52 K/MM3 (ref 0.84–5.2)
LYMPHOCYTES NFR BLD AUTO: 12 % (ref 21–46)
MAGNESIUM SERPL-MCNC: 2.3 MG/DL (ref 1.6–2.4)
MCHC RBC AUTO-ENTMCNC: 32.8 G/DL (ref 31.5–36.5)
MCV RBC AUTO: 89 FL (ref 80–100)
MONOCYTES # BLD AUTO: 0.62 K/MM3 (ref 0.16–1.47)
MONOCYTES NFR BLD AUTO: 5 % (ref 4–13)
NEUTROPHILS # BLD AUTO: 10.75 K/MM3 (ref 1.96–9.15)
NEUTROPHILS NFR BLD AUTO: 83 % (ref 41–73)
NRBC # BLD AUTO: 0 K/MM3 (ref 0–0.02)
NRBC BLD AUTO-RTO: 0 /100 WBC (ref 0–0.2)
PHOSPHATE SERPL-MCNC: 5.5 MG/DL (ref 2.5–4.9)
PLATELET # BLD AUTO: 142 K/MM3 (ref 150–400)
POTASSIUM SERPL-SCNC: 3.8 MMOL/L (ref 3.5–5.5)
PROT SERPL-MCNC: 6.9 G/DL (ref 6.4–8.2)
PROTHROMBIN TIME: 26.5 SEC (ref 9.7–11.5)
SODIUM SERPL-SCNC: 135 MMOL/L (ref 136–145)

## 2021-12-02 PROCEDURE — XW033E5 INTRODUCTION OF REMDESIVIR ANTI-INFECTIVE INTO PERIPHERAL VEIN, PERCUTANEOUS APPROACH, NEW TECHNOLOGY GROUP 5: ICD-10-PCS | Performed by: STUDENT IN AN ORGANIZED HEALTH CARE EDUCATION/TRAINING PROGRAM

## 2021-12-02 NOTE — NUR
END OF SHIFT SUMMARY
PT SLEPT MOST OF SHIFT; ABLE TO REPOSITION SELF. REMAINED AFIB WITH RVR; RATE
120-150'S. BP'S STABLE; SEE FLOWSHEET. PT KEPT REQUESTING PO FLUIDS; TOTAL OF
950CC INTAKE AND PT REQUESTING MORE. PT TRANSPORTED TO CT FOR CHEST SCAN THIS
AM. NS PLACED ON STANDBY DURING THIS TIME, WHICH WAS INFUSING AT 60ML/HR. LUNG
SOUNDS REMAIN COURSE/CRACKLES T/O ALL LOBES. MEZA CATHETER REMAINS PATENT AND
DRAINING CLEAR DARK, PRASANTH COLORED URINE. PT MORE ALERT AND ORIENTED AND
MAKING NEEDS KNOWN. WILL CONTINUE TO MONITOR UNTIL REPORT IS HANDED OFF TO
ONCOMING RN.

## 2021-12-02 NOTE — NUR
assumed care after report recieved
assessment complete. very confused, oriented to only self. very flat affect.
keeps pulling off O2 sensor and O2. Sats 97-99%. lungs clear. pedal pulse
unpalpable, able to doppler all.

## 2021-12-02 NOTE — NUR
ASSUMPTION OF CARE
PT AWAKE WHEN ENTERING ROOM. PT APPEARS MORE ALERT AND COHERENT THAN PREVIOUS
DAY. PT IS ABLE TO STATE NAME, , THAT HE IS IN THE HOSPITAL AND WHY. HE IS
UNABLE TO GIVE THE MONTH AND YEAR. PT REMOVED OXYMIZER AND ON ROOM AIR PT'S
SATS 88-90%. PT PLACED ON 2L VIA NC WITH SATS >95%. PT REPOSITIONS SELF
INDEPENDENTLY. DENIES PAIN OR DISCOMFORT. FLUID RESTRICTION OF 1500ML. MEZA
REMAINS IN PLACE DRAINING URINE.

## 2021-12-02 NOTE — NUR
SHIFT SUMMARY
NEURO: PT REMAINS ALERT AND POOR HISTORIAN. PT IS ABLE TO GIVE
NAME, , REASON HE IS IN THE HOSPITAL BUT CONSISTENTLY GIVES THE WRONG YEAR,
PRESIDENT, AND OCCASIONALLY DOES NOT REMEMBER HE IS AT THE HOSPITAL. PT
REPOSITIONS SELF INDEPENDENTLY AND MOVES ALL EXTREMITIES.
RESP: PT ON 2L NC. PT BECOMES DYSPNEIC ON EXCERTION.
CARDIAC: REMAINS IN AFIB WITH OCCASIONAL PVCS. HR HAS BEEN BETWEEN 100S-140S.
STRONG RADIAL PULSES. R PEDAL AND L POST-TIBIAL PULSES AUDIBLE WITH DOPPLER. R
LEG REMAINS MORE EDEMATOUS THAN L.
GI: ABDOMEN IS SOFT. PT USED BEDPAN AND HAD 1 SMALL SMEAR. PT TOLERATING MECH
SOFT FOOD. FLUID RESTRICTION OF 1500ML.
: MEZA REMAINS IN PLACE DRAINING PRASANTH URINE.
SKIN: SMALL AMOUNT OF MOTTLING ON BLE, IMPROVED FROM YESTERDAY.
WILL REPORT TO ONCOMING RN.

## 2021-12-03 LAB
ALBUMIN SERPL BCP-MCNC: 2.7 G/DL (ref 3.4–5)
ANION GAP SERPL CALCULATED.4IONS-SCNC: 14 MMOL/L (ref 6–16)
BUN SERPL-MCNC: 81 MG/DL (ref 8–24)
CALCIUM SERPL-MCNC: 8.7 MG/DL (ref 8.5–10.1)
CHLORIDE SERPL-SCNC: 94 MMOL/L (ref 98–108)
CO2 SERPL-SCNC: 23 MMOL/L (ref 21–32)
CREAT SERPL-MCNC: 2.26 MG/DL (ref 0.6–1.2)
DEPRECATED RDW RBC AUTO: 51.6 FL (ref 35.1–46.3)
ERYTHROCYTE [DISTWIDTH] IN BLOOD BY AUTOMATED COUNT: 15.8 % (ref 11.7–14.2)
GLUCOSE SERPL-MCNC: 373 MG/DL (ref 70–99)
HCT VFR BLD AUTO: 39.5 % (ref 37–53)
HGB BLD-MCNC: 13.1 G/DL (ref 13.5–17.5)
MAGNESIUM SERPL-MCNC: 2.5 MG/DL (ref 1.6–2.4)
MCHC RBC AUTO-ENTMCNC: 33.2 G/DL (ref 31.5–36.5)
MCV RBC AUTO: 90 FL (ref 80–100)
NRBC # BLD AUTO: 0 K/MM3 (ref 0–0.02)
NRBC BLD AUTO-RTO: 0 /100 WBC (ref 0–0.2)
PH BLDV: 7.46 [PH] (ref 7.34–7.37)
PHOSPHATE SERPL-MCNC: 4.8 MG/DL (ref 2.5–4.9)
PLATELET # BLD AUTO: 156 K/MM3 (ref 150–400)
POTASSIUM SERPL-SCNC: 4.2 MMOL/L (ref 3.5–5.5)
PROTHROMBIN TIME: 26.2 SEC (ref 9.7–11.5)
SODIUM SERPL-SCNC: 131 MMOL/L (ref 136–145)

## 2021-12-03 NOTE — NUR
Awake all through night, very confused and restless. pulling of oxygen and O2
sat sensor multiple times. O2 sats remain in upper 90's even with oxgen off.
increased shortness of breath when oxygen off. urine out put dark ayad with
600 ml out of dunn for shift total. One incont stool dark.
became very agitated trying to climb out of bed and pull every thing off. Dr Ferro notified

## 2021-12-03 NOTE — NUR
SHIFT SUMMARY
NEURO: PT REMAINS CONFUSED. PT OPENS EYES SPONTANEOUSLY, INCONSISTENTLY
FOLLOWS COMMANDS AND ANSWERS QUESTIONS. PT DENIES PAIN BUT IS RESTLESS IN BED.
GIRLFRIEND AT BEDSIDE FOR COMFORT.
RESP: LUNGS ARE COARSE WITH CRACKLES. PT PLACED BACK ON 2L VIA NC FOR
DESATURATIONS DURING REST.
CARDIAC: PT REMAINS IN AFIB WITH RATE 100S-120S. FAINT RADIAL PULSES. DOPPLER
PEDAL PULSES. BLE REMAIN EDEMATOUS AND REDDENED.
GI: ABDOMEN IS SOFT TO PALPATION. HYPOACTIVE. 1 INCONTINENT BM THIS SHIFT. PT
HAS BEEN KEPT NPO THIS SHIFT DUE TO COGNITION AND RISK FOR ASPIRATION.
: MEZA REMAINS IN PLACE DRAINING YELLOW URINE, ADEQUATE OUTPUT.
SKIN: UNCHANGED FROM PREVIOUS ASSESSMENTS.
DR SPEARS AND HOSPITALIST TEAM HAD DISCUSSION THIS EVENING WITH PT'S PARTNER
SAEID. PT'S CODE STATUS CHANGED TO DNR AND OTHER ORDERS RECEIVED.
WILL REPORT TO ONCOMING RN.

## 2021-12-03 NOTE — NUR
ASSUMPTION OF CARE
PT IS AWAKE, RESTLESS AND MOANING. PT REMOVING NC AND LEADS. ATTEMPTED TO
REDIRECT PT WITHOUT SUCCESS. PT IS UNABLE TO STATE HIS NAME OR ANY OTHER
INFORMATION. PT WILL NOT FOLLOW COMMANDS BUT MOVES ALL EXTREMITIES
INDEPENDENTLY. PT MAINLY MOANS AND MAKES INSENSIBLE SOUNDS. OCCASIONALLY SAYS
"YES/NO" TO QUESTIONS BUT ANSWERS ARE INCONSISTENT. PT SOB WITH EXCERTION, O2
INCREASED TO 5L VIA NC. MEZA REMAINS IN PLACE DRAINING YELLOW URINE.
SEE SHIFT ASSESSMENT.

## 2021-12-04 LAB
ALBUMIN SERPL BCP-MCNC: 2.6 G/DL (ref 3.4–5)
ALBUMIN/GLOB SERPL: 0.6 {RATIO} (ref 0.8–1.8)
ALT SERPL W P-5'-P-CCNC: 196 U/L (ref 12–78)
ANION GAP SERPL CALCULATED.4IONS-SCNC: 9 MMOL/L (ref 6–16)
AST SERPL W P-5'-P-CCNC: 125 U/L (ref 12–37)
BASOPHILS # BLD AUTO: 0.02 K/MM3 (ref 0–0.23)
BASOPHILS NFR BLD AUTO: 0 % (ref 0–2)
BILIRUB SERPL-MCNC: 9 MG/DL (ref 0.1–1)
BUN SERPL-MCNC: 82 MG/DL (ref 8–24)
CALCIUM SERPL-MCNC: 8.9 MG/DL (ref 8.5–10.1)
CHLORIDE SERPL-SCNC: 102 MMOL/L (ref 98–108)
CO2 SERPL-SCNC: 24 MMOL/L (ref 21–32)
CREAT SERPL-MCNC: 1.95 MG/DL (ref 0.6–1.2)
DEPRECATED RDW RBC AUTO: 47.7 FL (ref 35.1–46.3)
EOSINOPHIL # BLD AUTO: 0 K/MM3 (ref 0–0.68)
EOSINOPHIL NFR BLD AUTO: 0 % (ref 0–6)
ERYTHROCYTE [DISTWIDTH] IN BLOOD BY AUTOMATED COUNT: 15.2 % (ref 11.7–14.2)
GLOBULIN SER CALC-MCNC: 4.4 G/DL (ref 2.2–4)
GLUCOSE SERPL-MCNC: 133 MG/DL (ref 70–99)
HCT VFR BLD AUTO: 42 % (ref 37–53)
HGB BLD-MCNC: 14.3 G/DL (ref 13.5–17.5)
IMM GRANULOCYTES # BLD AUTO: 0.2 K/MM3 (ref 0–0.1)
IMM GRANULOCYTES NFR BLD AUTO: 1 % (ref 0–1)
LYMPHOCYTES # BLD AUTO: 1.43 K/MM3 (ref 0.84–5.2)
LYMPHOCYTES NFR BLD AUTO: 9 % (ref 21–46)
MCHC RBC AUTO-ENTMCNC: 34 G/DL (ref 31.5–36.5)
MCV RBC AUTO: 86 FL (ref 80–100)
MONOCYTES # BLD AUTO: 1.12 K/MM3 (ref 0.16–1.47)
MONOCYTES NFR BLD AUTO: 7 % (ref 4–13)
NEUTROPHILS # BLD AUTO: 13.96 K/MM3 (ref 1.96–9.15)
NEUTROPHILS NFR BLD AUTO: 84 % (ref 41–73)
NRBC # BLD AUTO: 0.03 K/MM3 (ref 0–0.02)
NRBC BLD AUTO-RTO: 0.2 /100 WBC (ref 0–0.2)
PLATELET # BLD AUTO: 118 K/MM3 (ref 150–400)
POTASSIUM SERPL-SCNC: 4.3 MMOL/L (ref 3.5–5.5)
PROT SERPL-MCNC: 7 G/DL (ref 6.4–8.2)
PROTHROMBIN TIME: 23.5 SEC (ref 9.7–11.5)
SODIUM SERPL-SCNC: 135 MMOL/L (ref 136–145)
UFH PPP CHRO-ACNC: <0.1 IU/ML

## 2021-12-04 NOTE — NUR
AM NOTE...
 
ASSUMED CARE OF PT AT 0700, THE PT IS AWAKE AND ALERT, HE IS ABLE TO STATE HIS
NAME AND  BUT WHEN ASKED WHERE HE WAS AT HE SAID "[facility]" AND WHEN ASKED
WHO THE PRESIDENT WAS HE SAID "AGNES JAVIER." THE PT CONTINUES TO BE IN BILAT
SOFT WRIST RESTRAINTS D/T PULLING OFF HIS O2 CANNULA AND THE PULSE OX
STICKERS. THE PT IS IN AFIB IN 'S-140'S, BP IS STABLE. PT DENIES ANY
PAIN AT THIS TIME. L/S COARSE CRACKLES NOTED T/O DIM IN THE BASES HE IS ON 2L
NC WITH O2 SATS >90%. THE PT HAS 1+ EDEMA NOTED TO HIS RIGHT FOOT AND TRACE
EDEMA NOTED TO HIS LLE. BLE PULSES FOUND BY DOPPLER RADIAL PULSES ARE
PALPABLE. MEZA IS PATENT AND DRAINING TO GRAVITY. PT'S TEMP IS 97.8.
 
CALL LIGHT IN REACH WILL CONITNUE TO MONITOR.

## 2021-12-04 NOTE — NUR
SUMMARY:
 
NEURO- PT CONFUSED BUT BETTER THAN START OF SHIFT. ABLE TO TELL ME HE IS IN
HOSPITAL, NAME, , AND GF NAME.  PERRL 4MM. HARKINS, RESTRAINED AS HE HAS PULLED
ON LINES AND DRAINS ALL NIGHT.  PLEASANT AND REDIRECTABLE.
CV- AFIB 90'S - 130'S. SBP STABLE ALL NIGHT. PULSES PALPABLE IN BUE AND
DOPPLER BLE.
RESP- ON O2 OVER NIGHT. PT DESAT TO MID 80'S FOR SPO2. WHEN AWAKE O2 NOT
NEEDED. SPO2 95 AND ABOVE. COARSE LUNG SOUNDS.
GI- BM OVER NIGHT, ACTIVE BS. NPO FOR SAFETY OVER NIGHT DUE TO AMS FROM
YESTERDAY.
- 850ML UOP. PRASANTH AND CLEAR. PATENT AND TEMP SENSING
SKIN. 1 PIV. PT POSSIBLE DOWNGRADE AND NO CONTINUOUS IV MEDS. SKIN RED ON
COCCYX BUT BLANCHES.

## 2021-12-04 NOTE — NUR
SHIFT SUMMARY...
 
NO ACUTE NEGATIVE CHANGES NOTED THIS SHIFT, THE PT STARTED TO BECOME AGITATED
AND YELLING ABOUT GOING HOME AROUND 1400, THE PT'S S.O. SAEID CAME DURING
VISITNG HOURS AND ATTEMPTED TO CALM THE PT DOWN BUT WAS NOT SUCCESSFUL.
PROVIDER WAS NOTIFIED AND AN ORDER FOR HALDOL 2-5 MG Q3HRS PRN WAS OBTAINED.
THE PT WAS ALSO GIVEN 12.5MCG OF IV FENTANYL TO HELP WITH GENERALIZED PAIN.
THE PT WAS ABLE TO CALM DOWN AND SLEEP AFTER THIS WAS GIVEN. THIS RN NOTED
THAT THE PT WAS HAVING LONG APNIC PERIODS OF 15-45 SECONDS COUNTED BY THIS RN.
THE PT'S S.O. SAEID TOLD THIS RN THAT "HE STOPS BREATHING ALL THE TIME AT HOME
IN HIS SLEEP, I AM ALWAYS AFRAID THAT ONE DAY ILL WAKE UP AND HE WILL HAVE
 IN HIS SLEEP FROM SLEEP APNIA AND NOT USING HIS CPAP."
 
PALLIATIVE CARE RN SPOKE WITH THE PT'S S.O. SAEID AND PLANS TO SPEAK WITH HER
AGAIN TOMORROW ABOUT POSSIBLE COMFORT CARE/HOME WITH HOSPICE.
 
CALL LIGHT IN REACH WILL CONTINUE TO MONITOR UNTIL REPORT IS GIVEN TO ONCOMING
RN.

## 2021-12-04 NOTE — NUR
MET WITH PT'S S/O SAEID THIS EVENING. SHE WAS TEARFUL WHILE DISCUSSING PT'S
DECLINING HEALTH. SHE STATES SHE HAS LITTLE HOPE HE WILL "COME BACK" FROM
THIS, AS HE HAS NOT IMPROVED SINCE ADMISSION. SHE STATES HER GOAL WOULD BE TO
TAKE HIM HOME WITH HOSPICE, AS HE WOULD LIKELY BE MORE COMFORTABLE THERE.
UNSURE AT THIS POINT IF PT IS AWARE OF HIS SURROUNDINGS. PLAN TO CALL SAEID
TOMORROW TO UPDATE HER AROUND NOON, FURTHER DISCUSS POSSIBLE PLAN FOR
DISCHARGE.

## 2021-12-05 LAB
PH BLDV: 7.3 [PH] (ref 7.34–7.37)
PROTHROMBIN TIME: 29.8 SEC (ref 9.7–11.5)

## 2021-12-05 NOTE — NUR
PATIENT ARRIVED TO MED FLOOR AFTER 5PM.  HE WAS PULLING AT BEDDING AND TRYING
TO GET OUT OF BED.  HE APPEARED ANXIOUS.  BREATHING HARD AND EPISODES OF APNEA
LASTING AS LONG AS 35 SECONDS.  GIRLFRIEND SAEID AT BEDSIDE AND ASKING THAT
PAITENT KEEP O2 ON VIA NASAL CANNULA FOR COMFORT. DECISION TO HAVE PATIENT AT
3 LITERS FOR HIS COMFORT.
 
WILL REMAIN AVAILABLE FOR THIS PATIENT FOR ANY WANTS OR NEEDS UNTIL SHIFT
CHANGE AND HAND OFF TO NIGHT SHIFT RN.
 
BEATRICE SALAZAR RN

## 2021-12-05 NOTE — NUR
AM NOTE...
 
ASSUMED CARE OF PT AT 0700, PT IS SITTING UP IN THE BED WITH A POSEY ON IN A
TRIPOD POSITION, PT SEEMS TO BE CHEYNE-CALDERÓN BREATHING WITH A RR IN THE HIGH
40'S THEN APNIC PERIODS THAT LAST UP TO 30 SECONDS THE PT IS IN AFIB W/RVR IN
'S-160'S INCREASED ECTOPY FROM YESTERDAY IS ALSO NOTED. THE PT HAS HAD
3 EPISODES OF SELF LIMITING VTACH NOTED ON TELE. THE PT'S BP IS CURRENTLY
STABLE WITH MAPS >65. THE PT HAS A PANICKED/SCARED LOOK IN HIS EYES. THE PT
DOES C/O OF CHEST PAIN, GRABBING/CLUTCHING HIS CHEST SAYING "SOMTHING IS WRONG
IT HURTS." THE PT IS UNABLE ELABORATE ANY FURTHER ON THAT STATEMENT. THE PT
IS MORE JAUNDICE THAN YESTERDAY, HIS BLE ARE COLD WITH MOTTLING NOTED. PULSES
ARE ONLY FOUND BY DOPPLER AND ARE MORE WEAK THAN YESTERDAY. THE PT IS ON 6L NC
WITH UNSURE O2 SATS D/T POOR PERFUSION AND POOR PULSE OX WAVE FORMS. NAIL BEDS
ARE DUSKY WITH CAP REFILL >3 SECONDS. THE PT IS UNABLE TO TOLERATE BEING LAID
BACK EVEN SLIGHTLY.
 
PROVIDER AT THE BEDSIDE TO ASSESS THE PT, NEW ORDERS OBTAINED FOR A VBG AND
STAT CHEST XRAY WITH PLANS TO CALL SAEID HIS S.O. AND DECISION MAKER.
 
WILL CONTINUE TO MONITOR.

## 2021-12-05 NOTE — NUR
PT UPDATE...
 
PT TO TRANSFER TO THE MEDICAL FLOOR, REPORT CALLED TO MEDICAL FLOOR RN. ALL OF
PT'S BELONGINGS PACKED AND SENT WITH THE PT. THE PT'S TARI BREAUX IS IN THE ROOM
AND NOTIFIED OF THE TRANSFER. PROVIDER AT THE BEDSIDE TO SEE THE PT AND FAMILY
IN THE ROOM.

## 2021-12-05 NOTE — NUR
PT'S S/O TANI IS AT BEDSIDE ALONG WITH HER SISTER. PT IS EXHIBITING S/S OF
TERMINAL AGITATION. BEDSIDE RN EDWIN JUST GAVE MORPHINE AND HALDOL, AS PT
BEGAN YELLING OUT, "NO,NO!", PULLING OFF HIS OXYGEN TUBING AND MAKING FREQUENT
POSITION CHANGES. HIS S/O TANI HAS BEEN ASKING PT'S NURSE NOT TO MEDICATE
HIM, BUT SHE NOW REALIZES THE PT IS UNCOMFORTABLE, AS HE HAS CONTINUED TO
MOAN, YELL OUT AND MOVE ABOUT.
 
AFTER MEDICATION, PT IS LYING QUIETLY, WITH TANI LAYING NEXT TO HIM IN THE
BED. SHE V/U ON CALLING FOR THE NURSE.
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
## 2021-12-05 NOTE — NUR
PT UPDATE....
 
THE PT WAS MADE COMFORT CARE PER PROVIDER AFTER SPEAKING WITH THE PT'S TARI BREAUX WHO IS THE DECISION MAKER. PALLIATIVE CARE RN WAS NOTIFIED. THE PT'S TARI BREAUX SEEMS TO BE HAVING A HARD TIME WITH THIS SITUATION, EMOTIONAL SUPPORT
GIVEN BY THIS RN AND THE PALLIATIVE CARE RN.
 
WILL CONTINUE TO MONITOR AND PROVIDE CARE.

## 2021-12-05 NOTE — NUR
SISTER YOLI CALLS FROM [place] [phone].  SHE HAS MEDICAL POWER OF
 AND WILL FAX IF NECESSARY.  SHE WANTS THE NEXT ON THE LIST FOR
MORTUARY'S.  SHE WOULD PREFER TO BE CALLED IN THE AM IF HE PASSES IN THE
MIDDLE OF THE NIGHT.
 
BEATRICE SALAZAR RN

## 2021-12-06 NOTE — NUR
SHIFT SUMMARY
PT UNRESPONSIVE WITH PAUSED BREATHING OF >20 SECONDS. COMFORT CARE MEASURES IN
PLACE. FAMILY AT BEDSIDE T/O NIGHT. BED IN LOWEST POSITION WITH CALL LIGHT IN
REACH. WILL CONTINUE TO MONITOR AND REPORT TO ONCOMING RN.

## 2021-12-06 NOTE — NUR
PT'S S/O REMAINS CLOSE BY, SHE LEFT THE ROOM EARLIER WITH HER SISTER TO GO TO
[place]. PT HAS BEEN UNRESPONSIVE TODAY; APPEARS TO BE COMFORTABLE, BEDSIDE
RN IS MEDICATING AS NEEDED WOTH ROXANOL. NO CHANGES NEEDED TO CARE PLAN. S/O
APPEARS TO BE GRIEVING APPROPRIATELY TODAY, AND DOES UNDERSTAND THE NEED FOR
MEDICATION.

## 2021-12-07 NOTE — NUR
NIGHT SHIFT SUMMARY
NO ACUTE CHANGES THIS SHIFT. PT CONTINUES ON COMFORT CARE. FAMILY MEMBERS AT
BEDSIDE THROUGH THE NIGHT. PT HAS SLEPT THROUGH THE SHIFT AND HAS BEEN
COMFORTABLE. MEDICATED FOR PAIN X1 THIS AM. WILL CONTINUE TO MONITOR.

## 2021-12-07 NOTE — NUR
COMFORT: PATIENT IS RESTING WITH EYES CLOSED, MOANING WITH MOUTH CARE AND
RESTLESS. FAMILY IS REQUESTING SUCTIONINJG AND PAIN MEDICATION. MOROPHINE 5 MG
GIVEN. SUCTIONING WAS DONE WITH LITTLE WAS REACHABLE.

## 2021-12-08 NOTE — NUR
COMFORT: PATIENT SCORES A 4 ON FLACC SCALE, IV MORPHINE WAS GIVEN. SECRETIONS
ARE IN LOWER AIRWAY, MOIST RESPIRATIONS OBSERVED. ORAL SUCTIONING AND MOUTH
CARE AND T&P ARE PROVIDED. EMOTIONAL SUPPORT IS GIVEN TO FAMILY. Mauro PUT OUT
500ML OF AN PRASANTH URINE.

## 2021-12-08 NOTE — NUR
Received referral from hospitalist (Dr. Jacome) on 12/8/2021.
 
Patient is to discharge with orders for hospice and family elected [facility].
 
Gathered supporting documentation for referral (face sheet, labs, imaging,
progress notes, palliative care note, and H&P) and sent to [facility]
Intake RN (Fuad Elizondo) for review of hospice appropriateness and ability to
accept patient onto service post discharge.
 
Will await further information from hospice Intake RN regarding the above.
 
Pao Madison
Referral Liaison

## 2021-12-08 NOTE — NUR
Comfort Care Visit
 
Pt resting in bed, non responsive, and appears jaudiced. Mottling on bilateral
knees noted. Pt appears comfortable with no S/S of distress at this time. Pt's
SO at bedside. Offered emotional support and therapeutic listening. Listened
as SO Elidia discusses condieration of taking Pt home with hospice services.
Answered questions and educated on actively dying process. Educated on hospice
philosophy. Continued therapeutic listening. Elidia reports plan to keep Pt in
the hospital. Elidia expresses appreciation and reports no other concerns at
this time.
 
Spoke with Primary RN Adebayo and discussed case.
 
Palliative Care will remain available.

## 2021-12-08 NOTE — NUR
Late Entry from 12/8/2021 at 1130:
 
Received notification from [facility] Intake RN (Fuad Elizondo) that
patient is hospice appropriate and able to be accepted onto service post
discharge.
 
Will attempt to meet with patient and family today to further discuss the
above.
 
Will continue to monitor and follow for discharge.
 
Pao Madison
Referral Liaison

## 2021-12-08 NOTE — NUR
COMFORT: PATIENT IS RESTING COMFORTABLY, FAMILY IS SLEEPING AT BEDSIDE. fAMILY
REQUESTED PATIENT NOT BE TURNED IS RESTING COMFORTABLY, NO T&P AT THIS TIME.

## 2021-12-08 NOTE — NUR
Received notification from nurse care manager (Dhruv Felix) that patient
has passed away.
 
Notified [facility] Intake RN (Fuad Elizondo) of the above.
 
No further interventions required.
 
Pao Madison
Referral Liaison

## 2022-05-22 NOTE — NUR
ASSUMED CARE NOTE:
 
ASSUMED CARE OF PT AT 2030, RECEVIED REPORT FROM JACKIE GRANT RN. PT ARRIVED TO
THE UNIT VIA STRECHER. PT TRANSFERD SELF TO UNIT BED, PT IS STEADY ON HIS
FEET, NO ASSISTANCE NEEDED. PT IS ALERT AND ORIENTED, ABLE TO RECALL RECENT
AND REMOTE EVENTS. PT IS ON RA WITH SPO2 ABOVE 95%, LUNG SOUNDS ARE CLEAR T/O.
AFEBRILE, BP STABLE. PT STATES HE IS SOB, RR 16-20. PT IS ABLE TO TALK IN
FULL SENTENCES. PT REPORTS TO HAVING A COUGH WITH GREEN/YELLOW SPUTUM, NONE
NOTED SINCE ARRIVAL TO THE UNIT. PT IS IN A-FIB WITH HR IN THE 70'S, PT DENIES
ANY CHEST PAIN AT THIS TIME. PT STATES THAT HIS HEART FLUTTERS AT TIMES.
ABDOMEN IS DISTENTED, PT STATES HE FEELS BLOATED. PT HAS +2 PITTING EDEMA IN
BLE. PT DENIES NAUSEA OR VOMITING AT THIS TIME. PT USED URINAL AT BED SIDE,
DARK PRASANTH URINE NOTED.  PT STATES THAT HIS SPEECH IS IMPARIED. BLOOD SUGAR IS
NORMAL, NO FACIAL DROOP, OR ARM WEAKNESS/NUMBNESS DETECTED, PT WAS ABLE TO
ANSWER QUESTIONS APPROPRIATLY AND REPEATE SIMPLE PHRASES. PT ORIENTED TO THE
ROOM, WILL CONTINUE TO ASSESS PT T/O SHIFT. BED AT LOWEST LEVEL, CALL LIGHT
WITHIN REACH. There are no Wet Read(s) to document.